# Patient Record
Sex: FEMALE | Race: BLACK OR AFRICAN AMERICAN | NOT HISPANIC OR LATINO
[De-identification: names, ages, dates, MRNs, and addresses within clinical notes are randomized per-mention and may not be internally consistent; named-entity substitution may affect disease eponyms.]

---

## 2017-08-26 ENCOUNTER — CHARTING TRANS (OUTPATIENT)
Dept: OTHER | Age: 27
End: 2017-08-26

## 2017-08-26 ENCOUNTER — LAB SERVICES (OUTPATIENT)
Dept: OTHER | Age: 27
End: 2017-08-26

## 2017-08-26 ASSESSMENT — PAIN SCALES - GENERAL: PAINLEVEL_OUTOF10: 7

## 2017-08-28 LAB — RAPID STREP GROUP A: POSITIVE

## 2017-08-29 ENCOUNTER — CHARTING TRANS (OUTPATIENT)
Dept: OTHER | Age: 27
End: 2017-08-29

## 2017-09-25 ENCOUNTER — CHARTING TRANS (OUTPATIENT)
Dept: OTHER | Age: 27
End: 2017-09-25

## 2017-09-25 ASSESSMENT — PAIN SCALES - GENERAL: PAINLEVEL_OUTOF10: 0

## 2017-09-26 ENCOUNTER — LAB SERVICES (OUTPATIENT)
Dept: OTHER | Age: 27
End: 2017-09-26

## 2017-09-26 LAB
APPEARANCE: NORMAL
BILIRUBIN: NORMAL
COLOR: YELLOW
GLUCOSE U: NORMAL
KETONES: NORMAL
LEUKOCYTES: NORMAL
NITRITE: NORMAL
OCCULT BLOOD: NORMAL
PH: 7
PROTEIN: NORMAL
URINE SPEC GRAVITY: 1.02
UROBILINOGEN: 0.2

## 2017-10-01 ENCOUNTER — CHARTING TRANS (OUTPATIENT)
Dept: OTHER | Age: 27
End: 2017-10-01

## 2017-10-01 LAB — BACTERIA UR CULT: NORMAL

## 2018-03-15 ENCOUNTER — HOSPITAL (OUTPATIENT)
Dept: OTHER | Age: 28
End: 2018-03-15
Attending: INTERNAL MEDICINE

## 2018-10-08 ENCOUNTER — CHARTING TRANS (OUTPATIENT)
Dept: OTHER | Age: 28
End: 2018-10-08

## 2018-10-08 ASSESSMENT — PAIN SCALES - GENERAL: PAINLEVEL_OUTOF10: 7

## 2018-11-02 VITALS
WEIGHT: 293 LBS | TEMPERATURE: 99.1 F | RESPIRATION RATE: 18 BRPM | HEART RATE: 70 BPM | HEIGHT: 67 IN | SYSTOLIC BLOOD PRESSURE: 133 MMHG | DIASTOLIC BLOOD PRESSURE: 81 MMHG | BODY MASS INDEX: 45.99 KG/M2 | OXYGEN SATURATION: 100 %

## 2018-11-03 VITALS
HEIGHT: 69 IN | RESPIRATION RATE: 18 BRPM | SYSTOLIC BLOOD PRESSURE: 138 MMHG | DIASTOLIC BLOOD PRESSURE: 90 MMHG | TEMPERATURE: 98.9 F | HEART RATE: 81 BPM | BODY MASS INDEX: 43.4 KG/M2 | OXYGEN SATURATION: 97 % | WEIGHT: 293 LBS

## 2018-11-03 VITALS
RESPIRATION RATE: 20 BRPM | OXYGEN SATURATION: 100 % | DIASTOLIC BLOOD PRESSURE: 88 MMHG | SYSTOLIC BLOOD PRESSURE: 135 MMHG | BODY MASS INDEX: 43.4 KG/M2 | HEIGHT: 69 IN | HEART RATE: 68 BPM | TEMPERATURE: 99.9 F | WEIGHT: 293 LBS

## 2018-11-27 VITALS
HEIGHT: 67 IN | TEMPERATURE: 98.1 F | SYSTOLIC BLOOD PRESSURE: 138 MMHG | HEART RATE: 67 BPM | RESPIRATION RATE: 18 BRPM | BODY MASS INDEX: 45.99 KG/M2 | OXYGEN SATURATION: 100 % | DIASTOLIC BLOOD PRESSURE: 87 MMHG | WEIGHT: 293 LBS

## 2019-04-13 ENCOUNTER — OFFICE VISIT (OUTPATIENT)
Dept: URGENT CARE | Age: 29
End: 2019-04-13

## 2019-04-13 VITALS
RESPIRATION RATE: 18 BRPM | WEIGHT: 293 LBS | SYSTOLIC BLOOD PRESSURE: 140 MMHG | TEMPERATURE: 98.3 F | HEIGHT: 69 IN | DIASTOLIC BLOOD PRESSURE: 85 MMHG | HEART RATE: 73 BPM | OXYGEN SATURATION: 100 % | BODY MASS INDEX: 43.4 KG/M2

## 2019-04-13 DIAGNOSIS — B97.89 VIRAL SINUSITIS: Primary | ICD-10-CM

## 2019-04-13 DIAGNOSIS — J32.9 VIRAL SINUSITIS: Primary | ICD-10-CM

## 2019-04-13 PROCEDURE — 99213 OFFICE O/P EST LOW 20 MIN: CPT | Performed by: NURSE PRACTITIONER

## 2019-04-13 RX ORDER — FLUTICASONE PROPIONATE 50 MCG
2 SPRAY, SUSPENSION (ML) NASAL DAILY
Qty: 16 G | Refills: 2 | Status: SHIPPED | OUTPATIENT
Start: 2019-04-13 | End: 2019-07-12

## 2019-04-13 RX ORDER — SODIUM CHLORIDE 0.65 %
2 AEROSOL, SPRAY (ML) NASAL PRN
Qty: 30 ML | Refills: 5 | Status: SHIPPED | OUTPATIENT
Start: 2019-04-13 | End: 2019-05-13

## 2019-04-13 ASSESSMENT — ENCOUNTER SYMPTOMS
GASTROINTESTINAL NEGATIVE: 1
CHILLS: 0
RESPIRATORY NEGATIVE: 1
SINUS PRESSURE: 1
FEVER: 0
SINUS PAIN: 0
HEADACHES: 0
SORE THROAT: 1
RHINORRHEA: 1
ADENOPATHY: 0
DIAPHORESIS: 0

## 2022-08-08 ENCOUNTER — OFFICE VISIT (OUTPATIENT)
Dept: FAMILY MEDICINE CLINIC | Facility: CLINIC | Age: 32
End: 2022-08-08
Payer: MEDICAID

## 2022-08-08 ENCOUNTER — PATIENT MESSAGE (OUTPATIENT)
Dept: FAMILY MEDICINE CLINIC | Facility: CLINIC | Age: 32
End: 2022-08-08

## 2022-08-08 VITALS
HEART RATE: 78 BPM | OXYGEN SATURATION: 98 % | SYSTOLIC BLOOD PRESSURE: 130 MMHG | DIASTOLIC BLOOD PRESSURE: 84 MMHG | TEMPERATURE: 98 F | WEIGHT: 293 LBS

## 2022-08-08 DIAGNOSIS — M79.601 PAIN IN BOTH UPPER EXTREMITIES: Primary | ICD-10-CM

## 2022-08-08 DIAGNOSIS — M79.602 PAIN IN BOTH UPPER EXTREMITIES: Primary | ICD-10-CM

## 2022-08-08 PROBLEM — D50.9 IRON DEFICIENCY ANEMIA: Status: ACTIVE | Noted: 2020-11-06

## 2022-08-08 PROBLEM — E66.01 MORBID OBESITY (HCC): Status: ACTIVE | Noted: 2022-08-08

## 2022-08-08 PROCEDURE — 3075F SYST BP GE 130 - 139MM HG: CPT | Performed by: FAMILY MEDICINE

## 2022-08-08 PROCEDURE — 3079F DIAST BP 80-89 MM HG: CPT | Performed by: FAMILY MEDICINE

## 2022-08-08 PROCEDURE — 99203 OFFICE O/P NEW LOW 30 MIN: CPT | Performed by: FAMILY MEDICINE

## 2022-08-08 RX ORDER — CYCLOBENZAPRINE HCL 10 MG
1 TABLET ORAL 3 TIMES DAILY PRN
COMMUNITY
Start: 2022-08-05

## 2022-08-08 RX ORDER — IBUPROFEN 600 MG/1
800 TABLET ORAL
COMMUNITY
Start: 2021-04-23 | End: 2022-08-08

## 2022-08-08 RX ORDER — IBUPROFEN 800 MG/1
1 TABLET ORAL EVERY 6 HOURS PRN
COMMUNITY
Start: 2022-08-05

## 2022-08-08 RX ORDER — CYCLOBENZAPRINE HCL 10 MG
10 TABLET ORAL 3 TIMES DAILY PRN
COMMUNITY
Start: 2022-08-05 | End: 2022-08-08

## 2022-08-08 NOTE — TELEPHONE ENCOUNTER
From: Sugey Patel  To: Nimesh Sneed MD  Sent: 8/8/2022 3:03 PM CDT  Subject: Note for work    Good Afternoon Doc. [place],    After speaking with my employer they do not have any light duty work that is truly light duty. The work they are requiring me to do still requires lifting. Per our discussion in the office today can you please update my note for work stating I am unable to return to work until 08/22/22.

## 2022-08-08 NOTE — TELEPHONE ENCOUNTER
Pt employer unable to accommodate light duty and pt requesting off work note until next apt 08/22/22    Note pended for review and sig if ok

## 2022-08-22 ENCOUNTER — OFFICE VISIT (OUTPATIENT)
Dept: FAMILY MEDICINE CLINIC | Facility: CLINIC | Age: 32
End: 2022-08-22
Payer: MEDICAID

## 2022-08-22 VITALS
HEIGHT: 66.63 IN | TEMPERATURE: 98 F | BODY MASS INDEX: 46.53 KG/M2 | RESPIRATION RATE: 16 BRPM | WEIGHT: 293 LBS | DIASTOLIC BLOOD PRESSURE: 90 MMHG | SYSTOLIC BLOOD PRESSURE: 132 MMHG | HEART RATE: 68 BPM | OXYGEN SATURATION: 98 %

## 2022-08-22 DIAGNOSIS — Z13.6 SCREENING FOR CARDIOVASCULAR CONDITION: ICD-10-CM

## 2022-08-22 DIAGNOSIS — Z00.00 ANNUAL PHYSICAL EXAM: Primary | ICD-10-CM

## 2022-08-22 DIAGNOSIS — Z13.0 SCREENING FOR ENDOCRINE, METABOLIC, AND IMMUNITY DISORDER: ICD-10-CM

## 2022-08-22 DIAGNOSIS — Z13.228 SCREENING FOR ENDOCRINE, METABOLIC, AND IMMUNITY DISORDER: ICD-10-CM

## 2022-08-22 DIAGNOSIS — Z13.29 SCREENING FOR ENDOCRINE, METABOLIC, AND IMMUNITY DISORDER: ICD-10-CM

## 2022-08-22 DIAGNOSIS — M79.601 PAIN IN BOTH UPPER EXTREMITIES: ICD-10-CM

## 2022-08-22 DIAGNOSIS — D50.9 IRON DEFICIENCY ANEMIA, UNSPECIFIED IRON DEFICIENCY ANEMIA TYPE: ICD-10-CM

## 2022-08-22 DIAGNOSIS — N92.0 MENORRHAGIA WITH REGULAR CYCLE: ICD-10-CM

## 2022-08-22 DIAGNOSIS — M79.602 PAIN IN BOTH UPPER EXTREMITIES: ICD-10-CM

## 2022-08-22 DIAGNOSIS — R01.1 SYSTOLIC MURMUR: ICD-10-CM

## 2022-08-22 DIAGNOSIS — E66.01 MORBID OBESITY (HCC): ICD-10-CM

## 2022-08-22 PROCEDURE — 3008F BODY MASS INDEX DOCD: CPT | Performed by: FAMILY MEDICINE

## 2022-08-22 PROCEDURE — 99395 PREV VISIT EST AGE 18-39: CPT | Performed by: FAMILY MEDICINE

## 2022-08-22 PROCEDURE — 3075F SYST BP GE 130 - 139MM HG: CPT | Performed by: FAMILY MEDICINE

## 2022-08-22 PROCEDURE — 3080F DIAST BP >= 90 MM HG: CPT | Performed by: FAMILY MEDICINE

## 2022-08-23 ENCOUNTER — TELEPHONE (OUTPATIENT)
Dept: FAMILY MEDICINE CLINIC | Facility: CLINIC | Age: 32
End: 2022-08-23

## 2022-08-23 DIAGNOSIS — D50.0 IRON DEFICIENCY ANEMIA DUE TO CHRONIC BLOOD LOSS: Primary | ICD-10-CM

## 2022-08-23 LAB
% SATURATION: 3 % (CALC) (ref 16–45)
ABSOLUTE BASOPHILS: 10 CELLS/UL (ref 0–200)
ABSOLUTE EOSINOPHILS: 150 CELLS/UL (ref 15–500)
ABSOLUTE LYMPHOCYTES: 1540 CELLS/UL (ref 850–3900)
ABSOLUTE MONOCYTES: 380 CELLS/UL (ref 200–950)
ABSOLUTE NEUTROPHILS: 2920 CELLS/UL (ref 1500–7800)
ALBUMIN/GLOBULIN RATIO: 1.5 (CALC) (ref 1–2.5)
ALBUMIN: 4 G/DL (ref 3.6–5.1)
ALKALINE PHOSPHATASE: 49 U/L (ref 31–125)
ALT: 22 U/L (ref 6–29)
AST: 15 U/L (ref 10–30)
BASOPHILS: 0.2 %
BILIRUBIN, TOTAL: 0.4 MG/DL (ref 0.2–1.2)
BUN: 7 MG/DL (ref 7–25)
CALCIUM: 9.1 MG/DL (ref 8.6–10.2)
CARBON DIOXIDE: 25 MMOL/L (ref 20–32)
CHLORIDE: 105 MMOL/L (ref 98–110)
CHOL/HDLC RATIO: 3.9 (CALC)
CHOLESTEROL, TOTAL: 155 MG/DL
CREATININE: 0.62 MG/DL (ref 0.5–0.97)
EGFR: 122 ML/MIN/1.73M2
EOSINOPHILS: 3 %
FERRITIN: 3 NG/ML (ref 16–154)
GLOBULIN: 2.7 G/DL (CALC) (ref 1.9–3.7)
GLUCOSE: 86 MG/DL (ref 65–99)
HDL CHOLESTEROL: 40 MG/DL
HEMATOCRIT: 23.7 % (ref 35–45)
HEMOGLOBIN: 6.4 G/DL (ref 11.7–15.5)
IRON BINDING CAPACITY: 392 MCG/DL (CALC) (ref 250–450)
IRON, TOTAL: 11 MCG/DL (ref 40–190)
LDL-CHOLESTEROL: 100 MG/DL (CALC)
LYMPHOCYTES: 30.8 %
MCH: 17 PG (ref 27–33)
MCHC: 27 G/DL (ref 32–36)
MCV: 62.9 FL (ref 80–100)
MONOCYTES: 7.6 %
MPV: 10.2 FL (ref 7.5–12.5)
NEUTROPHILS: 58.4 %
NON-HDL CHOLESTEROL: 115 MG/DL (CALC)
PLATELET COUNT: 419 THOUSAND/UL (ref 140–400)
POTASSIUM: 4.3 MMOL/L (ref 3.5–5.3)
PROTEIN, TOTAL: 6.7 G/DL (ref 6.1–8.1)
RDW: 18.9 % (ref 11–15)
RED BLOOD CELL COUNT: 3.77 MILLION/UL (ref 3.8–5.1)
SODIUM: 137 MMOL/L (ref 135–146)
TRIGLYCERIDES: 63 MG/DL
TSH W/REFLEX TO FT4: 1.47 MIU/L
WHITE BLOOD CELL COUNT: 5 THOUSAND/UL (ref 3.8–10.8)

## 2022-08-23 NOTE — TELEPHONE ENCOUNTER
Patient notified of test results via my-chart. Patient has viewed test results.   Seen by patient Nadyaloraine Barrow on 8/23/2022 11:42 AM

## 2022-08-23 NOTE — TELEPHONE ENCOUNTER
Maryuri Greene from [place] [facility] called wanted to inform doctor of pt's CBC test result completed on 8/22/22, the Hemoglobin is low at 6.4, when the normal range is 11.7-15.

## 2022-08-23 NOTE — TELEPHONE ENCOUNTER
FYI to PCP   Results are available for all labs done at [facility] 8/22/22  Please advise

## 2022-08-29 ENCOUNTER — OFFICE VISIT (OUTPATIENT)
Dept: HEMATOLOGY/ONCOLOGY | Facility: HOSPITAL | Age: 32
End: 2022-08-29
Attending: INTERNAL MEDICINE
Payer: MEDICAID

## 2022-08-29 VITALS
WEIGHT: 293 LBS | BODY MASS INDEX: 51 KG/M2 | SYSTOLIC BLOOD PRESSURE: 138 MMHG | OXYGEN SATURATION: 100 % | RESPIRATION RATE: 16 BRPM | HEART RATE: 85 BPM | TEMPERATURE: 97 F | DIASTOLIC BLOOD PRESSURE: 67 MMHG

## 2022-08-29 DIAGNOSIS — D50.0 IRON DEFICIENCY ANEMIA DUE TO CHRONIC BLOOD LOSS: Primary | ICD-10-CM

## 2022-08-29 PROCEDURE — 99245 OFF/OP CONSLTJ NEW/EST HI 55: CPT | Performed by: INTERNAL MEDICINE

## 2022-08-29 NOTE — PROGRESS NOTES
New consult for low iron. Pt states her periods are heavy. She will be having a pelvic ultrasound. She complains of fatigue and occasional shortness of breath and dizziness.       Outpatient Oncology Care Plan  Problem list:  knowledge deficit    Problems related to:    disease/disease progression    Interventions:  provided general teaching    Expected outcomes:  understands plan of care    Progress towards outcome:  making progress    Education Record    Learner:  Patient  Barriers / Limitations:  None  Method:  Brief focused  Outcome:  Shows understanding  Comments:

## 2022-08-31 ENCOUNTER — MED REC SCAN ONLY (OUTPATIENT)
Dept: FAMILY MEDICINE CLINIC | Facility: CLINIC | Age: 32
End: 2022-08-31

## 2022-09-02 ENCOUNTER — OFFICE VISIT (OUTPATIENT)
Dept: HEMATOLOGY/ONCOLOGY | Facility: HOSPITAL | Age: 32
End: 2022-09-02
Attending: INTERNAL MEDICINE
Payer: MEDICAID

## 2022-09-02 VITALS
OXYGEN SATURATION: 99 % | HEART RATE: 72 BPM | DIASTOLIC BLOOD PRESSURE: 84 MMHG | TEMPERATURE: 98 F | SYSTOLIC BLOOD PRESSURE: 126 MMHG | RESPIRATION RATE: 18 BRPM

## 2022-09-02 DIAGNOSIS — D50.0 IRON DEFICIENCY ANEMIA DUE TO CHRONIC BLOOD LOSS: Primary | ICD-10-CM

## 2022-09-02 PROCEDURE — 96365 THER/PROPH/DIAG IV INF INIT: CPT

## 2022-09-02 PROCEDURE — 96376 TX/PRO/DX INJ SAME DRUG ADON: CPT

## 2022-09-09 ENCOUNTER — HOSPITAL ENCOUNTER (OUTPATIENT)
Dept: ULTRASOUND IMAGING | Age: 32
Discharge: HOME | End: 2022-09-09
Attending: FAMILY MEDICINE
Payer: MEDICAID

## 2022-09-09 DIAGNOSIS — N92.0 MENORRHAGIA WITH REGULAR CYCLE: ICD-10-CM

## 2022-09-09 PROCEDURE — 76856 US EXAM PELVIC COMPLETE: CPT | Performed by: FAMILY MEDICINE

## 2022-09-09 PROCEDURE — 76830 TRANSVAGINAL US NON-OB: CPT | Performed by: FAMILY MEDICINE

## 2022-09-10 ENCOUNTER — HOSPITAL ENCOUNTER (OUTPATIENT)
Dept: CV DIAGNOSTICS | Facility: HOSPITAL | Age: 32
Discharge: HOME | End: 2022-09-10
Attending: FAMILY MEDICINE
Payer: MEDICAID

## 2022-09-10 DIAGNOSIS — R01.1 SYSTOLIC MURMUR: ICD-10-CM

## 2022-09-10 PROCEDURE — 93306 TTE W/DOPPLER COMPLETE: CPT | Performed by: FAMILY MEDICINE

## 2022-09-14 ENCOUNTER — TELEPHONE (OUTPATIENT)
Dept: FAMILY MEDICINE CLINIC | Facility: CLINIC | Age: 32
End: 2022-09-14

## 2022-09-14 NOTE — TELEPHONE ENCOUNTER
Incoming fax received for pt for FMLA regarding missed worked days. Forms completed for dates 08/09/2022-08/22/2022. Pt has since returned to work without limitations.     Forms placed, in provider bin for review and signature

## 2022-09-15 NOTE — TELEPHONE ENCOUNTER
Completed forms faxed to [facility] administration to fax # [phone]. Confirmation received and pt notified.      Forms sent to scan

## 2022-10-24 ENCOUNTER — OFFICE VISIT (OUTPATIENT)
Dept: HEMATOLOGY/ONCOLOGY | Facility: HOSPITAL | Age: 32
End: 2022-10-24
Attending: INTERNAL MEDICINE
Payer: MEDICAID

## 2022-10-24 VITALS
OXYGEN SATURATION: 100 % | TEMPERATURE: 98 F | RESPIRATION RATE: 16 BRPM | BODY MASS INDEX: 46.53 KG/M2 | SYSTOLIC BLOOD PRESSURE: 139 MMHG | DIASTOLIC BLOOD PRESSURE: 84 MMHG | HEART RATE: 73 BPM | HEIGHT: 66.61 IN | WEIGHT: 293 LBS

## 2022-10-24 DIAGNOSIS — D50.0 IRON DEFICIENCY ANEMIA DUE TO CHRONIC BLOOD LOSS: ICD-10-CM

## 2022-10-24 DIAGNOSIS — N92.0 MENORRHAGIA WITH REGULAR CYCLE: Primary | ICD-10-CM

## 2022-10-24 LAB
BASOPHILS # BLD AUTO: 0.01 X10(3) UL (ref 0–0.2)
BASOPHILS NFR BLD AUTO: 0.2 %
DEPRECATED HBV CORE AB SER IA-ACNC: 3.2 NG/ML
EOSINOPHIL # BLD AUTO: 0.08 X10(3) UL (ref 0–0.7)
EOSINOPHIL NFR BLD AUTO: 1.7 %
ERYTHROCYTE [DISTWIDTH] IN BLOOD BY AUTOMATED COUNT: 23.9 %
HCT VFR BLD AUTO: 27.2 %
HGB BLD-MCNC: 7.7 G/DL
IMM GRANULOCYTES # BLD AUTO: 0.01 X10(3) UL (ref 0–1)
IMM GRANULOCYTES NFR BLD: 0.2 %
IRON SATN MFR SERPL: 2 %
IRON SERPL-MCNC: 10 UG/DL
LYMPHOCYTES # BLD AUTO: 1.63 X10(3) UL (ref 1–4)
LYMPHOCYTES NFR BLD AUTO: 34.2 %
MCH RBC QN AUTO: 19.3 PG (ref 26–34)
MCHC RBC AUTO-ENTMCNC: 28.3 G/DL (ref 31–37)
MCV RBC AUTO: 68 FL
MONOCYTES # BLD AUTO: 0.48 X10(3) UL (ref 0.1–1)
MONOCYTES NFR BLD AUTO: 10.1 %
NEUTROPHILS # BLD AUTO: 2.55 X10 (3) UL (ref 1.5–7.7)
NEUTROPHILS # BLD AUTO: 2.55 X10(3) UL (ref 1.5–7.7)
NEUTROPHILS NFR BLD AUTO: 53.6 %
PLATELET # BLD AUTO: 358 10(3)UL (ref 150–450)
PLATELET MORPHOLOGY: NORMAL
RBC # BLD AUTO: 4 X10(6)UL
TIBC SERPL-MCNC: 459 UG/DL (ref 240–450)
TRANSFERRIN SERPL-MCNC: 308 MG/DL (ref 200–360)
WBC # BLD AUTO: 4.8 X10(3) UL (ref 4–11)

## 2022-10-24 PROCEDURE — 99215 OFFICE O/P EST HI 40 MIN: CPT | Performed by: INTERNAL MEDICINE

## 2022-10-24 NOTE — PROGRESS NOTES
Problem: Breathing Pattern Ineffective  Goal: Air exchange is effective, demonstrated by Sp02 sat of greater then or = 92% (or as ordered)  Outcome: Outcome Met, Continue evaluating goal progress toward completion      Patient presents with: Follow - Up: MD assessment following iron    Pt is for follow up. Had IV iron infed on 09/02/2022. She reports to feeling much better; fatigue is improved and no SOB.     Education Record    Learner:  Patient    Disease / Diagnosis: JESUS    Barriers / Limitations:  None   Comments:    Method:  Brief focused   Comments:    General Topics:  Side effects and symptom management and Plan of care reviewed   Comments:    Outcome:  Shows understanding   Comments:

## 2022-10-24 NOTE — PROGRESS NOTES
Hi Ms Benoit Number, your iron levels are still low and you are still anemic. I'm going to schedule you for 2 IV iron infusions this time, each spaced a month apart, and then we will recheck iron levels when you come back to see me.

## 2022-10-31 ENCOUNTER — OFFICE VISIT (OUTPATIENT)
Dept: HEMATOLOGY/ONCOLOGY | Facility: HOSPITAL | Age: 32
End: 2022-10-31
Attending: INTERNAL MEDICINE
Payer: MEDICAID

## 2022-10-31 VITALS
HEART RATE: 82 BPM | TEMPERATURE: 98 F | DIASTOLIC BLOOD PRESSURE: 76 MMHG | SYSTOLIC BLOOD PRESSURE: 132 MMHG | OXYGEN SATURATION: 100 % | RESPIRATION RATE: 16 BRPM

## 2022-10-31 DIAGNOSIS — D50.0 IRON DEFICIENCY ANEMIA DUE TO CHRONIC BLOOD LOSS: Primary | ICD-10-CM

## 2022-10-31 PROCEDURE — 96365 THER/PROPH/DIAG IV INF INIT: CPT

## 2022-10-31 NOTE — PROGRESS NOTES
Pt here for iron infusion. Arrives Ambulating independently, accompanied by self    Modifications in dose or schedule: No     Frequency of blood return and site check throughout administration: Prior to administration   Discharged to Home, Ambulating independently, accompanied by self    Outpatient Oncology Care Plan  Problem list:  anemia  Problems related to:    disease/disease progression  Interventions:  administered iron  Expected outcomes:  optimal lab values  Progress towards outcome:  making progress    Education Record    Learner:  Patient  Barriers / Limitations:  None  Method:  Brief focused  Outcome:  Shows understanding  Comments:observed pt 30 min post infusion. Pt tolerated infusion. No c/o. VSS.  See flowsheet

## 2022-11-02 ENCOUNTER — PATIENT MESSAGE (OUTPATIENT)
Dept: FAMILY MEDICINE CLINIC | Facility: CLINIC | Age: 32
End: 2022-11-02

## 2022-11-16 ENCOUNTER — OFFICE VISIT (OUTPATIENT)
Dept: OBGYN CLINIC | Facility: CLINIC | Age: 32
End: 2022-11-16
Payer: MEDICAID

## 2022-11-16 VITALS
WEIGHT: 293 LBS | DIASTOLIC BLOOD PRESSURE: 82 MMHG | HEART RATE: 87 BPM | SYSTOLIC BLOOD PRESSURE: 126 MMHG | BODY MASS INDEX: 50 KG/M2

## 2022-11-16 DIAGNOSIS — D21.9 FIBROIDS: Primary | ICD-10-CM

## 2022-11-16 DIAGNOSIS — N92.1 MENOMETRORRHAGIA: ICD-10-CM

## 2022-11-16 DIAGNOSIS — D50.0 IRON DEFICIENCY ANEMIA DUE TO CHRONIC BLOOD LOSS: ICD-10-CM

## 2022-11-16 PROCEDURE — 99244 OFF/OP CNSLTJ NEW/EST MOD 40: CPT | Performed by: OBSTETRICS & GYNECOLOGY

## 2022-11-16 PROCEDURE — 3074F SYST BP LT 130 MM HG: CPT | Performed by: OBSTETRICS & GYNECOLOGY

## 2022-11-16 PROCEDURE — 3079F DIAST BP 80-89 MM HG: CPT | Performed by: OBSTETRICS & GYNECOLOGY

## 2022-11-16 RX ORDER — IBUPROFEN 800 MG/1
800 TABLET ORAL EVERY 6 HOURS PRN
COMMUNITY
Start: 2022-11-04

## 2022-11-16 RX ORDER — NORETHINDRONE ACETATE AND ETHINYL ESTRADIOL .03; 1.5 MG/1; MG/1
1 TABLET ORAL DAILY
Qty: 3 EACH | Refills: 3 | Status: SHIPPED | OUTPATIENT
Start: 2022-11-16 | End: 2023-11-16

## 2022-11-28 ENCOUNTER — OFFICE VISIT (OUTPATIENT)
Dept: HEMATOLOGY/ONCOLOGY | Facility: HOSPITAL | Age: 32
End: 2022-11-28
Attending: INTERNAL MEDICINE
Payer: MEDICAID

## 2022-11-28 VITALS
BODY MASS INDEX: 46.53 KG/M2 | OXYGEN SATURATION: 99 % | WEIGHT: 293 LBS | HEIGHT: 66.61 IN | RESPIRATION RATE: 16 BRPM | HEART RATE: 60 BPM | SYSTOLIC BLOOD PRESSURE: 130 MMHG | DIASTOLIC BLOOD PRESSURE: 77 MMHG

## 2022-11-28 DIAGNOSIS — D50.0 IRON DEFICIENCY ANEMIA DUE TO CHRONIC BLOOD LOSS: Primary | ICD-10-CM

## 2022-11-28 PROCEDURE — 96365 THER/PROPH/DIAG IV INF INIT: CPT

## 2022-11-28 NOTE — PROGRESS NOTES
Education Record    Learner:  Patient    Disease / Diagnosis: anemia, infed infusion    Barriers / Limitations:  None   Comments:    Method:  Discussion   Comments:    General Topics:  Plan of care reviewed   Comments:    Outcome:  Shows understanding   Comments:

## 2022-12-29 ENCOUNTER — OFFICE VISIT (OUTPATIENT)
Dept: HEMATOLOGY/ONCOLOGY | Facility: HOSPITAL | Age: 32
End: 2022-12-29
Attending: INTERNAL MEDICINE
Payer: MEDICAID

## 2022-12-29 VITALS
HEART RATE: 67 BPM | WEIGHT: 293 LBS | SYSTOLIC BLOOD PRESSURE: 139 MMHG | BODY MASS INDEX: 46.53 KG/M2 | RESPIRATION RATE: 18 BRPM | OXYGEN SATURATION: 100 % | TEMPERATURE: 98 F | DIASTOLIC BLOOD PRESSURE: 81 MMHG | HEIGHT: 66.61 IN

## 2022-12-29 DIAGNOSIS — N92.0 MENORRHAGIA WITH REGULAR CYCLE: ICD-10-CM

## 2022-12-29 DIAGNOSIS — D50.0 IRON DEFICIENCY ANEMIA DUE TO CHRONIC BLOOD LOSS: ICD-10-CM

## 2022-12-29 LAB
BASOPHILS # BLD AUTO: 0.01 X10(3) UL (ref 0–0.2)
BASOPHILS NFR BLD AUTO: 0.2 %
DEPRECATED HBV CORE AB SER IA-ACNC: 14.8 NG/ML
EOSINOPHIL # BLD AUTO: 0.07 X10(3) UL (ref 0–0.7)
EOSINOPHIL NFR BLD AUTO: 1.4 %
ERYTHROCYTE [DISTWIDTH] IN BLOOD BY AUTOMATED COUNT: 22.6 %
FOLATE SERPL-MCNC: 7.3 NG/ML (ref 8.7–?)
HCT VFR BLD AUTO: 29.3 %
HGB BLD-MCNC: 8.9 G/DL
IMM GRANULOCYTES # BLD AUTO: 0.01 X10(3) UL (ref 0–1)
IMM GRANULOCYTES NFR BLD: 0.2 %
IRON SATN MFR SERPL: 6 %
IRON SERPL-MCNC: 23 UG/DL
LYMPHOCYTES # BLD AUTO: 1.52 X10(3) UL (ref 1–4)
LYMPHOCYTES NFR BLD AUTO: 30.3 %
MCH RBC QN AUTO: 22.7 PG (ref 26–34)
MCHC RBC AUTO-ENTMCNC: 30.4 G/DL (ref 31–37)
MCV RBC AUTO: 74.7 FL
MONOCYTES # BLD AUTO: 0.47 X10(3) UL (ref 0.1–1)
MONOCYTES NFR BLD AUTO: 9.4 %
NEUTROPHILS # BLD AUTO: 2.94 X10 (3) UL (ref 1.5–7.7)
NEUTROPHILS # BLD AUTO: 2.94 X10(3) UL (ref 1.5–7.7)
NEUTROPHILS NFR BLD AUTO: 58.5 %
PLATELET # BLD AUTO: 302 10(3)UL (ref 150–450)
PLATELET MORPHOLOGY: NORMAL
RBC # BLD AUTO: 3.92 X10(6)UL
TIBC SERPL-MCNC: 383 UG/DL (ref 240–450)
TRANSFERRIN SERPL-MCNC: 257 MG/DL (ref 200–360)
VIT B12 SERPL-MCNC: 516 PG/ML (ref 193–986)
WBC # BLD AUTO: 5 X10(3) UL (ref 4–11)

## 2022-12-29 PROCEDURE — 99215 OFFICE O/P EST HI 40 MIN: CPT | Performed by: INTERNAL MEDICINE

## 2022-12-29 RX ORDER — FOLIC ACID 1 MG/1
1 TABLET ORAL DAILY
Qty: 90 TABLET | Refills: 3 | Status: SHIPPED | OUTPATIENT
Start: 2022-12-29

## 2022-12-29 NOTE — PROGRESS NOTES
Education Record    Learner:  Patient    Disease / Adelina Dodd    Barriers / Limitations:  None   Comments:    Method:  Discussion   Comments:    General Topics:  Plan of care reviewed   Comments:    Outcome:  Shows understanding   Comments:  Last infusion 11/28/22, infusion went well. Has noticed that her period will come earlier after infusion, and is heavier and more cramping. Pt feeling fine. Periods are still heavy, has stopped the BCP, had an appt in Feb to readdress. Reports the BCP made her periods worse.

## 2023-01-06 ENCOUNTER — APPOINTMENT (OUTPATIENT)
Dept: HEMATOLOGY/ONCOLOGY | Facility: HOSPITAL | Age: 33
End: 2023-01-06
Attending: INTERNAL MEDICINE
Payer: MEDICAID

## 2023-01-13 ENCOUNTER — OFFICE VISIT (OUTPATIENT)
Dept: HEMATOLOGY/ONCOLOGY | Facility: HOSPITAL | Age: 33
End: 2023-01-13
Attending: INTERNAL MEDICINE
Payer: MEDICAID

## 2023-01-13 VITALS
HEART RATE: 61 BPM | DIASTOLIC BLOOD PRESSURE: 79 MMHG | SYSTOLIC BLOOD PRESSURE: 124 MMHG | TEMPERATURE: 98 F | RESPIRATION RATE: 16 BRPM | OXYGEN SATURATION: 100 %

## 2023-01-13 DIAGNOSIS — D50.0 IRON DEFICIENCY ANEMIA DUE TO CHRONIC BLOOD LOSS: Primary | ICD-10-CM

## 2023-01-13 PROCEDURE — 96365 THER/PROPH/DIAG IV INF INIT: CPT

## 2023-02-03 ENCOUNTER — APPOINTMENT (OUTPATIENT)
Dept: HEMATOLOGY/ONCOLOGY | Facility: HOSPITAL | Age: 33
End: 2023-02-03
Attending: INTERNAL MEDICINE
Payer: MEDICAID

## 2023-02-10 ENCOUNTER — OFFICE VISIT (OUTPATIENT)
Dept: HEMATOLOGY/ONCOLOGY | Facility: HOSPITAL | Age: 33
End: 2023-02-10
Attending: INTERNAL MEDICINE
Payer: MEDICAID

## 2023-02-10 VITALS
HEART RATE: 64 BPM | DIASTOLIC BLOOD PRESSURE: 75 MMHG | SYSTOLIC BLOOD PRESSURE: 113 MMHG | RESPIRATION RATE: 18 BRPM | TEMPERATURE: 98 F | OXYGEN SATURATION: 99 %

## 2023-02-10 DIAGNOSIS — D50.0 IRON DEFICIENCY ANEMIA DUE TO CHRONIC BLOOD LOSS: Primary | ICD-10-CM

## 2023-02-10 PROCEDURE — 96365 THER/PROPH/DIAG IV INF INIT: CPT

## 2023-02-13 ENCOUNTER — OFFICE VISIT (OUTPATIENT)
Dept: OBGYN CLINIC | Facility: CLINIC | Age: 33
End: 2023-02-13
Payer: MEDICAID

## 2023-02-13 VITALS
HEIGHT: 69 IN | DIASTOLIC BLOOD PRESSURE: 80 MMHG | WEIGHT: 293 LBS | SYSTOLIC BLOOD PRESSURE: 132 MMHG | HEART RATE: 81 BPM | BODY MASS INDEX: 43.4 KG/M2

## 2023-02-13 DIAGNOSIS — D50.0 IRON DEFICIENCY ANEMIA DUE TO CHRONIC BLOOD LOSS: ICD-10-CM

## 2023-02-13 DIAGNOSIS — N92.0 MENORRHAGIA WITH REGULAR CYCLE: ICD-10-CM

## 2023-02-13 DIAGNOSIS — D25.9 UTERINE LEIOMYOMA, UNSPECIFIED LOCATION: ICD-10-CM

## 2023-02-13 DIAGNOSIS — Z01.812 PRE-PROCEDURAL LABORATORY EXAMINATION: ICD-10-CM

## 2023-02-13 DIAGNOSIS — N93.9 ABNORMAL UTERINE BLEEDING (AUB): Primary | ICD-10-CM

## 2023-02-13 LAB
CONTROL LINE PRESENT WITH A CLEAR BACKGROUND (YES/NO): YES YES/NO
KIT LOT #: NORMAL NUMERIC
PREGNANCY TEST, URINE: NEGATIVE

## 2023-02-13 PROCEDURE — 88305 TISSUE EXAM BY PATHOLOGIST: CPT | Performed by: OBSTETRICS & GYNECOLOGY

## 2023-02-13 RX ORDER — DROSPIRENONE AND ETHINYL ESTRADIOL 0.03MG-3MG
1 KIT ORAL DAILY
Qty: 84 TABLET | Refills: 3 | Status: SHIPPED | OUTPATIENT
Start: 2023-02-13 | End: 2024-02-13

## 2023-02-13 NOTE — PROCEDURES
Procedure: Endometrial biopsy     Date of Procedure: 23    Pre-procedure diagnosis:   AUB    Post-procedure diagnosis:    AUB    Indications:   33year old female  who presents for endometrial biopsy / AUB    Procedure details: The procedure, risks, benefits and alternatives were discussed with the patient. The patient was informed of risks including but not limited to the risk of bleeding, infection, injury and insufficient tissue collection. All questions and concerns were addressed. The patient provided verbal and written consent. The patient was placed in a supine position with feet positioned into stirrups. A sterile speculum was placed into the vagina and the cervix was visualized with findings noted below. The cervix was cleaned and prepped with betadine. Lidocaine gel applied. An Jaylyn was placed on the anterior lip of the cervix. The endometrial Pipelle was then advanced through the cervical canal. The uterus sounded to 10+ cm. The Pipelle was then engaged with suction force noted and advance in various angles along the uterine cavity. A moderate amount of endometrial tissue was noted and collected to be sent to pathology. This was repeated for 1 more pass. The Jaylyn was removed. Good hemostasis noted. The patient tolerated the procedure well. The patient was advised to return as directed. Findings:   Normal cervix, no lesions   Normal uterus, sounded to 10+ cm   Moderate amount of endometrial tissue   S/p EMB  Good hemostasis     Disposition: Stable    Complications: None    Follow up:  As directed     Delmis Nieves MD   [facility] - OBGYN    Note to patient and family   The [facility] makes medical notes available to patients in the interest of transparency. However, please be advised that this is a medical document. It is intended as irdi-yv-uswg communication. It is written and medical language may contain abbreviations or verbiage that are technical and unfamiliar.   It may appear blunt or direct. Medical documents are intended to carry relevant information, facts as evident, and the clinical opinion of the practitioner. This note could include assistance by [employer] recognition.  Errors in content may be related to improper recognition by the system; efforts to review and correct have been done but errors may still exist.

## 2023-02-14 NOTE — PATIENT INSTRUCTIONS
[facility] Department of OB/GYN  After Care Instructions for Endometrial Biopsy      Biopsy Results   You will receive a phone call with your biopsy results in 7 business days. If you have not received your results in 7 days, please contact our office. The results of your biopsy will determine if further treatment will be necessary. Bleeding   You may have some light bleeding or blackish clumpy discharge for several days after your biopsy. Restrictions    You should avoid intercourse or tampon use for 1 day after your biopsy. Pain    You may experience mild menstrual cramping after your biopsy. You may use Ibuprofen, Aleve or Tylenol to relieve your discomfort. If you experience severe or persistent pain contact our office. If you have any additional questions, please call us at [phone].

## 2023-04-03 ENCOUNTER — PATIENT MESSAGE (OUTPATIENT)
Dept: OBGYN CLINIC | Facility: CLINIC | Age: 33
End: 2023-04-03

## 2023-04-06 ENCOUNTER — HOSPITAL ENCOUNTER (EMERGENCY)
Facility: HOSPITAL | Age: 33
Discharge: HOME | End: 2023-04-07
Attending: EMERGENCY MEDICINE
Payer: MEDICAID

## 2023-04-06 DIAGNOSIS — D50.0 IRON DEFICIENCY ANEMIA DUE TO CHRONIC BLOOD LOSS: Primary | ICD-10-CM

## 2023-04-06 LAB
ALBUMIN SERPL-MCNC: 3.4 G/DL (ref 3.4–5)
ALBUMIN/GLOB SERPL: 1 {RATIO} (ref 1–2)
ALP LIVER SERPL-CCNC: 47 U/L
ALT SERPL-CCNC: 36 U/L
ANION GAP SERPL CALC-SCNC: 9 MMOL/L (ref 0–18)
ANTIBODY SCREEN: NEGATIVE
AST SERPL-CCNC: 23 U/L (ref 15–37)
BASOPHILS # BLD AUTO: 0.02 X10(3) UL (ref 0–0.2)
BASOPHILS NFR BLD AUTO: 0.2 %
BILIRUB SERPL-MCNC: 0.2 MG/DL (ref 0.1–2)
BUN BLD-MCNC: 13 MG/DL (ref 7–18)
CALCIUM BLD-MCNC: 8.5 MG/DL (ref 8.5–10.1)
CHLORIDE SERPL-SCNC: 107 MMOL/L (ref 98–112)
CO2 SERPL-SCNC: 25 MMOL/L (ref 21–32)
CREAT BLD-MCNC: 0.75 MG/DL
EOSINOPHIL # BLD AUTO: 0.23 X10(3) UL (ref 0–0.7)
EOSINOPHIL NFR BLD AUTO: 2.4 %
ERYTHROCYTE [DISTWIDTH] IN BLOOD BY AUTOMATED COUNT: 20.3 %
GFR SERPLBLD BASED ON 1.73 SQ M-ARVRAT: 108 ML/MIN/1.73M2 (ref 60–?)
GLOBULIN PLAS-MCNC: 3.4 G/DL (ref 2.8–4.4)
GLUCOSE BLD-MCNC: 99 MG/DL (ref 70–99)
HCT VFR BLD AUTO: 22.1 %
HGB BLD-MCNC: 6.9 G/DL
IMM GRANULOCYTES # BLD AUTO: 0.03 X10(3) UL (ref 0–1)
IMM GRANULOCYTES NFR BLD: 0.3 %
LYMPHOCYTES # BLD AUTO: 2.81 X10(3) UL (ref 1–4)
LYMPHOCYTES NFR BLD AUTO: 29.2 %
MCH RBC QN AUTO: 25.1 PG (ref 26–34)
MCHC RBC AUTO-ENTMCNC: 31.2 G/DL (ref 31–37)
MCV RBC AUTO: 80.4 FL
MONOCYTES # BLD AUTO: 0.56 X10(3) UL (ref 0.1–1)
MONOCYTES NFR BLD AUTO: 5.8 %
NEUTROPHILS # BLD AUTO: 5.98 X10 (3) UL (ref 1.5–7.7)
NEUTROPHILS # BLD AUTO: 5.98 X10(3) UL (ref 1.5–7.7)
NEUTROPHILS NFR BLD AUTO: 62.1 %
OSMOLALITY SERPL CALC.SUM OF ELEC: 292 MOSM/KG (ref 275–295)
PLATELET # BLD AUTO: 328 10(3)UL (ref 150–450)
POTASSIUM SERPL-SCNC: 3.9 MMOL/L (ref 3.5–5.1)
PROT SERPL-MCNC: 6.8 G/DL (ref 6.4–8.2)
RBC # BLD AUTO: 2.75 X10(6)UL
RH BLOOD TYPE: POSITIVE
SODIUM SERPL-SCNC: 141 MMOL/L (ref 136–145)
WBC # BLD AUTO: 9.6 X10(3) UL (ref 4–11)

## 2023-04-06 PROCEDURE — 80053 COMPREHEN METABOLIC PANEL: CPT | Performed by: EMERGENCY MEDICINE

## 2023-04-06 PROCEDURE — 85025 COMPLETE CBC W/AUTO DIFF WBC: CPT | Performed by: EMERGENCY MEDICINE

## 2023-04-06 PROCEDURE — 93010 ELECTROCARDIOGRAM REPORT: CPT

## 2023-04-06 PROCEDURE — 86901 BLOOD TYPING SEROLOGIC RH(D): CPT

## 2023-04-06 PROCEDURE — 99285 EMERGENCY DEPT VISIT HI MDM: CPT

## 2023-04-06 PROCEDURE — 86850 RBC ANTIBODY SCREEN: CPT | Performed by: EMERGENCY MEDICINE

## 2023-04-06 PROCEDURE — 80053 COMPREHEN METABOLIC PANEL: CPT

## 2023-04-06 PROCEDURE — 36415 COLL VENOUS BLD VENIPUNCTURE: CPT

## 2023-04-06 PROCEDURE — 86900 BLOOD TYPING SEROLOGIC ABO: CPT | Performed by: EMERGENCY MEDICINE

## 2023-04-06 PROCEDURE — 86900 BLOOD TYPING SEROLOGIC ABO: CPT

## 2023-04-06 PROCEDURE — 36430 TRANSFUSION BLD/BLD COMPNT: CPT

## 2023-04-06 PROCEDURE — 86850 RBC ANTIBODY SCREEN: CPT

## 2023-04-06 PROCEDURE — 86920 COMPATIBILITY TEST SPIN: CPT

## 2023-04-06 PROCEDURE — 93005 ELECTROCARDIOGRAM TRACING: CPT

## 2023-04-06 PROCEDURE — 86901 BLOOD TYPING SEROLOGIC RH(D): CPT | Performed by: EMERGENCY MEDICINE

## 2023-04-06 PROCEDURE — 85025 COMPLETE CBC W/AUTO DIFF WBC: CPT

## 2023-04-06 NOTE — TELEPHONE ENCOUNTER
If the patient continues to have moderate heavy bleeding then I would advise to initiate a OCP taper. We can use a tapering regimen of her current OCP. Start with three pills on day 1 of her OCP followed by two pills on day 2, and one pill on day 3 and continue daily pills. To improve tolerability, the pills may be taken several hours apart. Take with food. As we discussed, the patient will continue to have heavy menstrual bleeding and persistent anemia due to her large uterine fibroid. I would advise with patient to consider surgical management for removal of her large uterine fibroid. I would also advised the patient to consider surgical consultation with a minimally invasive GYN surgeon if she desires a laparoscopic approach. If the patient desires to discuss further medical management or surgical management then I would advise for her to make an appointment in the office. Thank you.

## 2023-04-06 NOTE — TELEPHONE ENCOUNTER
Went over options with patient that Dr. Steve Nova advised. Discussed tapering dose with patient. Patient states she is still having a headache and dizziness. I did advise ER again for eval of symptoms and to have blood levels checked due to the heavy bleeding. Patient will go to ER. Patient states she will call back for an appointment to discuss surgery. Understanding expressed.

## 2023-04-07 VITALS
TEMPERATURE: 98 F | DIASTOLIC BLOOD PRESSURE: 87 MMHG | RESPIRATION RATE: 24 BRPM | OXYGEN SATURATION: 100 % | HEART RATE: 83 BPM | SYSTOLIC BLOOD PRESSURE: 171 MMHG

## 2023-04-07 LAB
ATRIAL RATE: 92 BPM
P AXIS: 60 DEGREES
P-R INTERVAL: 170 MS
Q-T INTERVAL: 364 MS
QRS DURATION: 86 MS
QTC CALCULATION (BEZET): 450 MS
R AXIS: 9 DEGREES
T AXIS: 27 DEGREES
VENTRICULAR RATE: 92 BPM

## 2023-04-07 NOTE — ED INITIAL ASSESSMENT (HPI)
A&Ox3 ambulatory patient referred to ED from [facility] for low hgb    Patient has hx of anemia and fibroids, has recd multiple blood transfusions in the past    Reports sob/LH/dizziness/HA since Monday    Also endorses heavy menstrual flow at this time    Had POCT hgb done at [facility] resulting <7

## 2023-04-08 LAB
BLOOD TYPE BARCODE: 5100
UNIT VOLUME: 350 ML

## 2023-04-10 ENCOUNTER — APPOINTMENT (OUTPATIENT)
Dept: HEMATOLOGY/ONCOLOGY | Age: 33
End: 2023-04-10
Attending: INTERNAL MEDICINE
Payer: MEDICAID

## 2023-04-10 ENCOUNTER — APPOINTMENT (OUTPATIENT)
Dept: HEMATOLOGY/ONCOLOGY | Facility: HOSPITAL | Age: 33
End: 2023-04-10
Attending: INTERNAL MEDICINE
Payer: MEDICAID

## 2023-04-14 ENCOUNTER — LAB ENCOUNTER (OUTPATIENT)
Dept: LAB | Age: 33
End: 2023-04-14
Attending: FAMILY MEDICINE
Payer: MEDICAID

## 2023-04-14 DIAGNOSIS — D50.0 IRON DEFICIENCY ANEMIA DUE TO CHRONIC BLOOD LOSS: ICD-10-CM

## 2023-04-14 DIAGNOSIS — N92.0 MENORRHAGIA WITH REGULAR CYCLE: ICD-10-CM

## 2023-04-14 LAB
BASOPHILS # BLD AUTO: 0.01 X10(3) UL (ref 0–0.2)
BASOPHILS NFR BLD AUTO: 0.2 %
DEPRECATED HBV CORE AB SER IA-ACNC: 7.2 NG/ML
EOSINOPHIL # BLD AUTO: 0.09 X10(3) UL (ref 0–0.7)
EOSINOPHIL NFR BLD AUTO: 1.4 %
ERYTHROCYTE [DISTWIDTH] IN BLOOD BY AUTOMATED COUNT: 18.9 %
HCT VFR BLD AUTO: 26.4 %
HGB BLD-MCNC: 7.9 G/DL
IMM GRANULOCYTES # BLD AUTO: 0.01 X10(3) UL (ref 0–1)
IMM GRANULOCYTES NFR BLD: 0.2 %
IRON SATN MFR SERPL: 5 %
IRON SERPL-MCNC: 19 UG/DL
LYMPHOCYTES # BLD AUTO: 1.53 X10(3) UL (ref 1–4)
LYMPHOCYTES NFR BLD AUTO: 23 %
MCH RBC QN AUTO: 24.3 PG (ref 26–34)
MCHC RBC AUTO-ENTMCNC: 29.9 G/DL (ref 31–37)
MCV RBC AUTO: 81.2 FL
MONOCYTES # BLD AUTO: 0.45 X10(3) UL (ref 0.1–1)
MONOCYTES NFR BLD AUTO: 6.8 %
NEUTROPHILS # BLD AUTO: 4.56 X10 (3) UL (ref 1.5–7.7)
NEUTROPHILS # BLD AUTO: 4.56 X10(3) UL (ref 1.5–7.7)
NEUTROPHILS NFR BLD AUTO: 68.4 %
PLATELET # BLD AUTO: 381 10(3)UL (ref 150–450)
RBC # BLD AUTO: 3.25 X10(6)UL
TIBC SERPL-MCNC: 361 UG/DL (ref 240–450)
TRANSFERRIN SERPL-MCNC: 242 MG/DL (ref 200–360)
WBC # BLD AUTO: 6.7 X10(3) UL (ref 4–11)

## 2023-04-14 PROCEDURE — 85025 COMPLETE CBC W/AUTO DIFF WBC: CPT

## 2023-04-14 PROCEDURE — 83550 IRON BINDING TEST: CPT

## 2023-04-14 PROCEDURE — 82728 ASSAY OF FERRITIN: CPT

## 2023-04-14 PROCEDURE — 83540 ASSAY OF IRON: CPT

## 2023-04-14 PROCEDURE — 36415 COLL VENOUS BLD VENIPUNCTURE: CPT

## 2023-04-14 NOTE — PROGRESS NOTES
Pt with significant iron deficiency anemia. Required transfusion in ED. Calculated iron deficit ~1900mg. Infed now on national shortage. Continues to have significant bleeding but myomectomy not scheduled yet.  Will schedule 4 weekly doses of 510mg ferraheme

## 2023-04-18 ENCOUNTER — TELEPHONE (OUTPATIENT)
Dept: FAMILY MEDICINE CLINIC | Facility: CLINIC | Age: 33
End: 2023-04-18

## 2023-04-18 ENCOUNTER — OFFICE VISIT (OUTPATIENT)
Dept: FAMILY MEDICINE CLINIC | Facility: CLINIC | Age: 33
End: 2023-04-18
Payer: MEDICAID

## 2023-04-18 VITALS
SYSTOLIC BLOOD PRESSURE: 124 MMHG | WEIGHT: 293 LBS | HEART RATE: 68 BPM | OXYGEN SATURATION: 98 % | RESPIRATION RATE: 18 BRPM | BODY MASS INDEX: 46 KG/M2 | TEMPERATURE: 99 F | DIASTOLIC BLOOD PRESSURE: 78 MMHG

## 2023-04-18 DIAGNOSIS — E66.01 MORBID OBESITY (HCC): ICD-10-CM

## 2023-04-18 DIAGNOSIS — D50.9 IRON DEFICIENCY ANEMIA, UNSPECIFIED IRON DEFICIENCY ANEMIA TYPE: Primary | ICD-10-CM

## 2023-04-18 DIAGNOSIS — N92.0 MENORRHAGIA WITH REGULAR CYCLE: ICD-10-CM

## 2023-04-18 DIAGNOSIS — D25.9 UTERINE LEIOMYOMA, UNSPECIFIED LOCATION: ICD-10-CM

## 2023-04-18 PROCEDURE — 99214 OFFICE O/P EST MOD 30 MIN: CPT | Performed by: FAMILY MEDICINE

## 2023-04-18 PROCEDURE — 3074F SYST BP LT 130 MM HG: CPT | Performed by: FAMILY MEDICINE

## 2023-04-18 PROCEDURE — 3078F DIAST BP <80 MM HG: CPT | Performed by: FAMILY MEDICINE

## 2023-04-18 NOTE — TELEPHONE ENCOUNTER
Medical assessment form reviewed completed and signed by provider Dr Anna Ramirez.  Forms faxed to [facility] [phone] 4/18/23

## 2023-04-20 ENCOUNTER — OFFICE VISIT (OUTPATIENT)
Dept: HEMATOLOGY/ONCOLOGY | Facility: HOSPITAL | Age: 33
End: 2023-04-20
Attending: INTERNAL MEDICINE
Payer: MEDICAID

## 2023-04-20 VITALS
DIASTOLIC BLOOD PRESSURE: 65 MMHG | SYSTOLIC BLOOD PRESSURE: 123 MMHG | OXYGEN SATURATION: 98 % | HEART RATE: 75 BPM | RESPIRATION RATE: 20 BRPM

## 2023-04-20 DIAGNOSIS — D50.0 IRON DEFICIENCY ANEMIA DUE TO CHRONIC BLOOD LOSS: Primary | ICD-10-CM

## 2023-04-20 PROCEDURE — 96374 THER/PROPH/DIAG INJ IV PUSH: CPT

## 2023-04-20 NOTE — PROGRESS NOTES
Education Record    Learner:  Patient    Disease / Lila Estimable deficiency anemia due to chronic blood loss     Barriers / Limitations:  None   Comments:    Method:  Brief focused   Comments:    General Topics:  Infection, Medication, Pain, Precautions, Procedure, Side effects and symptom management, Plan of care reviewed and Fall risk and prevention   Comments:    Outcome:  Shows understanding   Comments: Tolerated well. Observed for 30 min.  VSS

## 2023-04-27 ENCOUNTER — OFFICE VISIT (OUTPATIENT)
Dept: HEMATOLOGY/ONCOLOGY | Facility: HOSPITAL | Age: 33
End: 2023-04-27
Attending: INTERNAL MEDICINE
Payer: MEDICAID

## 2023-04-27 VITALS
SYSTOLIC BLOOD PRESSURE: 131 MMHG | HEART RATE: 64 BPM | TEMPERATURE: 98 F | DIASTOLIC BLOOD PRESSURE: 72 MMHG | RESPIRATION RATE: 18 BRPM | OXYGEN SATURATION: 100 %

## 2023-04-27 DIAGNOSIS — D50.0 IRON DEFICIENCY ANEMIA DUE TO CHRONIC BLOOD LOSS: Primary | ICD-10-CM

## 2023-04-27 PROCEDURE — 96374 THER/PROPH/DIAG INJ IV PUSH: CPT

## 2023-04-27 NOTE — PROGRESS NOTES
Pt here for iron infusion. Arrives Ambulating independently, accompanied by self           Modifications in dose or schedule: No     Frequency of blood return and site check throughout administration: Prior to administration   Discharged to Home, Ambulating independently, accompanied by:self    Outpatient Oncology Care Plan  Problem list:  anemia  Problems related to:    disease/disease progression  Interventions:  administered iron  Expected outcomes:  optimal lab values  Progress towards outcome:  making progress    Education Record    Learner:  Patient  Barriers / Limitations:  None  Method:  Brief focused  Outcome:  Shows understanding  Comments:observed pt 30 min post infusion. Pt tolerated infusion. No c/o. VSS.  See flowsheet

## 2023-05-04 ENCOUNTER — OFFICE VISIT (OUTPATIENT)
Dept: HEMATOLOGY/ONCOLOGY | Facility: HOSPITAL | Age: 33
End: 2023-05-04
Attending: INTERNAL MEDICINE
Payer: MEDICAID

## 2023-05-04 VITALS
SYSTOLIC BLOOD PRESSURE: 125 MMHG | RESPIRATION RATE: 16 BRPM | DIASTOLIC BLOOD PRESSURE: 79 MMHG | TEMPERATURE: 98 F | OXYGEN SATURATION: 100 % | HEART RATE: 89 BPM

## 2023-05-04 DIAGNOSIS — D50.0 IRON DEFICIENCY ANEMIA DUE TO CHRONIC BLOOD LOSS: Primary | ICD-10-CM

## 2023-05-04 PROCEDURE — 96374 THER/PROPH/DIAG INJ IV PUSH: CPT

## 2023-05-04 NOTE — PROGRESS NOTES
Education Record    Learner:  Patient    Disease / Diagnosis: iron deficiency anemia    Barriers / Limitations:  None   Comments:    Method:  Brief focused   Comments:    General Topics:  Medication and Plan of care reviewed   Comments:    Outcome:  Shows understanding   Comments:

## 2023-05-11 ENCOUNTER — OFFICE VISIT (OUTPATIENT)
Dept: HEMATOLOGY/ONCOLOGY | Facility: HOSPITAL | Age: 33
End: 2023-05-11
Attending: INTERNAL MEDICINE
Payer: MEDICAID

## 2023-05-11 VITALS
TEMPERATURE: 99 F | OXYGEN SATURATION: 97 % | DIASTOLIC BLOOD PRESSURE: 72 MMHG | HEART RATE: 74 BPM | RESPIRATION RATE: 18 BRPM | SYSTOLIC BLOOD PRESSURE: 117 MMHG

## 2023-05-11 DIAGNOSIS — D50.0 IRON DEFICIENCY ANEMIA DUE TO CHRONIC BLOOD LOSS: Primary | ICD-10-CM

## 2023-05-11 PROCEDURE — 96374 THER/PROPH/DIAG INJ IV PUSH: CPT

## 2023-05-11 NOTE — PROGRESS NOTES
Education Record    Learner:  Patient    Disease / Diagnosis: iron deficiency anemia    Barriers / Limitations:  None   Comments:    Method:  Brief focused   Comments:    General Topics:  Medication and Plan of care reviewed   Comments:    Outcome:  Shows understanding   Comments:    Per BRADLEY Lui w/ Dr. Zorita Apley, patient to return in 2 months for labs only. Patient aware.

## 2023-06-14 ENCOUNTER — APPOINTMENT (OUTPATIENT)
Dept: ULTRASOUND IMAGING | Facility: HOSPITAL | Age: 33
End: 2023-06-14
Attending: EMERGENCY MEDICINE
Payer: MEDICAID

## 2023-06-14 ENCOUNTER — TELEMEDICINE (OUTPATIENT)
Dept: FAMILY MEDICINE CLINIC | Facility: CLINIC | Age: 33
End: 2023-06-14

## 2023-06-14 ENCOUNTER — OFFICE VISIT (OUTPATIENT)
Dept: OBGYN CLINIC | Facility: CLINIC | Age: 33
End: 2023-06-14
Payer: MEDICAID

## 2023-06-14 ENCOUNTER — HOSPITAL ENCOUNTER (EMERGENCY)
Facility: HOSPITAL | Age: 33
Discharge: HOME | End: 2023-06-14
Attending: EMERGENCY MEDICINE
Payer: MEDICAID

## 2023-06-14 VITALS
TEMPERATURE: 97 F | SYSTOLIC BLOOD PRESSURE: 106 MMHG | HEIGHT: 69 IN | HEART RATE: 80 BPM | RESPIRATION RATE: 11 BRPM | BODY MASS INDEX: 43.4 KG/M2 | OXYGEN SATURATION: 99 % | DIASTOLIC BLOOD PRESSURE: 70 MMHG | WEIGHT: 293 LBS

## 2023-06-14 VITALS
BODY MASS INDEX: 46 KG/M2 | HEART RATE: 98 BPM | SYSTOLIC BLOOD PRESSURE: 122 MMHG | WEIGHT: 293 LBS | DIASTOLIC BLOOD PRESSURE: 80 MMHG

## 2023-06-14 DIAGNOSIS — N93.8 DYSFUNCTIONAL UTERINE BLEEDING: Primary | ICD-10-CM

## 2023-06-14 DIAGNOSIS — D25.9 UTERINE LEIOMYOMA, UNSPECIFIED LOCATION: ICD-10-CM

## 2023-06-14 DIAGNOSIS — N93.9 ABNORMAL UTERINE BLEEDING (AUB): Primary | ICD-10-CM

## 2023-06-14 DIAGNOSIS — J30.1 ALLERGIC RHINITIS DUE TO POLLEN, UNSPECIFIED SEASONALITY: Primary | ICD-10-CM

## 2023-06-14 DIAGNOSIS — E66.01 MORBID OBESITY (HCC): ICD-10-CM

## 2023-06-14 DIAGNOSIS — R55 SYNCOPE, NEAR: ICD-10-CM

## 2023-06-14 DIAGNOSIS — N92.0 MENORRHAGIA WITH REGULAR CYCLE: ICD-10-CM

## 2023-06-14 DIAGNOSIS — D50.0 IRON DEFICIENCY ANEMIA DUE TO CHRONIC BLOOD LOSS: ICD-10-CM

## 2023-06-14 LAB
ALBUMIN SERPL-MCNC: 3.2 G/DL (ref 3.4–5)
ALBUMIN/GLOB SERPL: 1 {RATIO} (ref 1–2)
ALP LIVER SERPL-CCNC: 36 U/L
ALT SERPL-CCNC: 27 U/L
ANION GAP SERPL CALC-SCNC: 6 MMOL/L (ref 0–18)
ANTIBODY SCREEN: NEGATIVE
AST SERPL-CCNC: 22 U/L (ref 15–37)
ATRIAL RATE: 79 BPM
BASOPHILS # BLD AUTO: 0.01 X10(3) UL (ref 0–0.2)
BASOPHILS NFR BLD AUTO: 0.2 %
BILIRUB SERPL-MCNC: 0.2 MG/DL (ref 0.1–2)
BUN BLD-MCNC: 8 MG/DL (ref 7–18)
CALCIUM BLD-MCNC: 8.3 MG/DL (ref 8.5–10.1)
CHLORIDE SERPL-SCNC: 111 MMOL/L (ref 98–112)
CO2 SERPL-SCNC: 23 MMOL/L (ref 21–32)
CREAT BLD-MCNC: 0.84 MG/DL
D DIMER PPP FEU-MCNC: 0.4 UG/ML FEU (ref ?–0.5)
EOSINOPHIL # BLD AUTO: 0.05 X10(3) UL (ref 0–0.7)
EOSINOPHIL NFR BLD AUTO: 0.8 %
ERYTHROCYTE [DISTWIDTH] IN BLOOD BY AUTOMATED COUNT: 16.8 %
GFR SERPLBLD BASED ON 1.73 SQ M-ARVRAT: 95 ML/MIN/1.73M2 (ref 60–?)
GLOBULIN PLAS-MCNC: 3.1 G/DL (ref 2.8–4.4)
GLUCOSE BLD-MCNC: 140 MG/DL (ref 70–99)
HCT VFR BLD AUTO: 34.4 %
HGB BLD-MCNC: 11.1 G/DL
IMM GRANULOCYTES # BLD AUTO: 0.02 X10(3) UL (ref 0–1)
IMM GRANULOCYTES NFR BLD: 0.3 %
LYMPHOCYTES # BLD AUTO: 0.95 X10(3) UL (ref 1–4)
LYMPHOCYTES NFR BLD AUTO: 16 %
MCH RBC QN AUTO: 26.3 PG (ref 26–34)
MCHC RBC AUTO-ENTMCNC: 32.3 G/DL (ref 31–37)
MCV RBC AUTO: 81.5 FL
MONOCYTES # BLD AUTO: 0.36 X10(3) UL (ref 0.1–1)
MONOCYTES NFR BLD AUTO: 6.1 %
NEUTROPHILS # BLD AUTO: 4.55 X10 (3) UL (ref 1.5–7.7)
NEUTROPHILS # BLD AUTO: 4.55 X10(3) UL (ref 1.5–7.7)
NEUTROPHILS NFR BLD AUTO: 76.6 %
OSMOLALITY SERPL CALC.SUM OF ELEC: 291 MOSM/KG (ref 275–295)
P AXIS: 50 DEGREES
P-R INTERVAL: 160 MS
PLATELET # BLD AUTO: 288 10(3)UL (ref 150–450)
POTASSIUM SERPL-SCNC: 3.9 MMOL/L (ref 3.5–5.1)
PROT SERPL-MCNC: 6.3 G/DL (ref 6.4–8.2)
Q-T INTERVAL: 368 MS
QRS DURATION: 86 MS
QTC CALCULATION (BEZET): 421 MS
R AXIS: -1 DEGREES
RBC # BLD AUTO: 4.22 X10(6)UL
RH BLOOD TYPE: POSITIVE
SODIUM SERPL-SCNC: 140 MMOL/L (ref 136–145)
T AXIS: 35 DEGREES
TROPONIN I HIGH SENSITIVITY: 10 NG/L
VENTRICULAR RATE: 79 BPM
WBC # BLD AUTO: 5.9 X10(3) UL (ref 4–11)

## 2023-06-14 PROCEDURE — 85025 COMPLETE CBC W/AUTO DIFF WBC: CPT

## 2023-06-14 PROCEDURE — 93005 ELECTROCARDIOGRAM TRACING: CPT

## 2023-06-14 PROCEDURE — 86900 BLOOD TYPING SEROLOGIC ABO: CPT | Performed by: EMERGENCY MEDICINE

## 2023-06-14 PROCEDURE — 80053 COMPREHEN METABOLIC PANEL: CPT | Performed by: EMERGENCY MEDICINE

## 2023-06-14 PROCEDURE — 99215 OFFICE O/P EST HI 40 MIN: CPT | Performed by: OBSTETRICS & GYNECOLOGY

## 2023-06-14 PROCEDURE — 86900 BLOOD TYPING SEROLOGIC ABO: CPT

## 2023-06-14 PROCEDURE — 96361 HYDRATE IV INFUSION ADD-ON: CPT

## 2023-06-14 PROCEDURE — 86850 RBC ANTIBODY SCREEN: CPT | Performed by: EMERGENCY MEDICINE

## 2023-06-14 PROCEDURE — 96360 HYDRATION IV INFUSION INIT: CPT

## 2023-06-14 PROCEDURE — 86901 BLOOD TYPING SEROLOGIC RH(D): CPT | Performed by: EMERGENCY MEDICINE

## 2023-06-14 PROCEDURE — 3074F SYST BP LT 130 MM HG: CPT | Performed by: OBSTETRICS & GYNECOLOGY

## 2023-06-14 PROCEDURE — 93010 ELECTROCARDIOGRAM REPORT: CPT

## 2023-06-14 PROCEDURE — 76830 TRANSVAGINAL US NON-OB: CPT | Performed by: EMERGENCY MEDICINE

## 2023-06-14 PROCEDURE — 85025 COMPLETE CBC W/AUTO DIFF WBC: CPT | Performed by: EMERGENCY MEDICINE

## 2023-06-14 PROCEDURE — 76856 US EXAM PELVIC COMPLETE: CPT | Performed by: EMERGENCY MEDICINE

## 2023-06-14 PROCEDURE — 99284 EMERGENCY DEPT VISIT MOD MDM: CPT

## 2023-06-14 PROCEDURE — 99213 OFFICE O/P EST LOW 20 MIN: CPT | Performed by: FAMILY MEDICINE

## 2023-06-14 PROCEDURE — 86850 RBC ANTIBODY SCREEN: CPT

## 2023-06-14 PROCEDURE — 86901 BLOOD TYPING SEROLOGIC RH(D): CPT

## 2023-06-14 PROCEDURE — 84484 ASSAY OF TROPONIN QUANT: CPT | Performed by: EMERGENCY MEDICINE

## 2023-06-14 PROCEDURE — 99285 EMERGENCY DEPT VISIT HI MDM: CPT

## 2023-06-14 PROCEDURE — 80053 COMPREHEN METABOLIC PANEL: CPT

## 2023-06-14 PROCEDURE — 85379 FIBRIN DEGRADATION QUANT: CPT | Performed by: EMERGENCY MEDICINE

## 2023-06-14 PROCEDURE — 3079F DIAST BP 80-89 MM HG: CPT | Performed by: OBSTETRICS & GYNECOLOGY

## 2023-06-14 RX ORDER — FLUTICASONE PROPIONATE 50 MCG
2 SPRAY, SUSPENSION (ML) NASAL DAILY
Qty: 1 EACH | Refills: 1 | Status: SHIPPED | OUTPATIENT
Start: 2023-06-14

## 2023-06-14 RX ORDER — RELUGOLIX, ESTRADIOL HEMIHYDRATE, AND NORETHINDRONE ACETATE 40; 1; .5 MG/1; MG/1; MG/1
1 TABLET, FILM COATED ORAL DAILY
Qty: 30 TABLET | Refills: 5 | Status: SHIPPED | OUTPATIENT
Start: 2023-06-14

## 2023-06-14 NOTE — ED INITIAL ASSESSMENT (HPI)
Pt was 's office when she felt dizzy and had a near syncopal episode. Thinks that her Hgb may be low. Dr. Jeannie Meyer pelvis US due to abd and hx of fibroids.  Menstrual cycle began today and is heavier than normal.

## 2023-06-14 NOTE — PROGRESS NOTES
Video Visit    This visit is conducted using Telemedicine with live, interactive video and audio. Ghassan Jaime  verbally consents to a Video visit. Patient understands and accepts financial responsibility for any deductible, co-insurance and/or co-pays associated with this service. Duration of the service: 10: 37  minutes      Summary of topics discussed:     Sinus problems: She reports lately she has had sinus and ear problems. She has had increased green mucous coming otu of her nose, worse in the mornings. Her ears feels clogged and had to go to the [facility] to have her ears irrigated and afterwards she can hear so much better. She denies any cough, congestions, or cold symptoms. She has no itchy or watery eyes, no scratchy throat or ear pain. She has been using Flonase without relief. Is not on any allergy pill right now. Obesity: Pt has been trying to lose weight and feels like she is not making any progress. She was 312 lbs in 4/2023 in office, states she has not weighed herself lately, but thinks she is about the same weight today. She has implemented intermittent fasting, no eating after 7pm.  She is eating more fruits and vegetables. Once her work schedule is figured out, she plans to work out more. She would like a referral to the weight loss clinic. ROS: as stated per HPI  Physical Exam: Exam is limited due to no face to face visit today. Pt is awake and alert, does not appear to be in acute distress today. Unable to examine HEENT given limitations of video visit.       Assessment/Plan:  1) Allergic rhinitis  - suspect underlying allergies causing sinus and ear symptoms  - has been using Flonase daily  - recommend to start daily PO antihistamine and regular saline sinus rinse  - pt requesting referral to ENT (Dr. Lindsey Bynum)    2) Morbid obesity  - cont regular exercise and healthy diet changes  - referral to Weight loss clinic      Orders Placed This [address] [facility] - [address] [facility] [address], [address]      ENT Referral - In [place], [place]

## 2023-06-14 NOTE — PATIENT INSTRUCTIONS
Minimally invasive gynecological surgeons who specializes in complex pelvic surgery  Dr. Franklin Griffin, Dr. Chuck Rosenthal [phone]    Dr. Crys Christianson [phone]    Dr. Carroll Seals, [place]  [phone]    Dr. Antonia Ghotra  Telephone [phone]    Dr. Delano Brewer [phone]    Dr. David Yang   Telephone [phone]    Dr. Colleen Peng [phone]    Dr. Alfie Moscoso [phone]    Dr. Melissa Fay   Telephone [phone]

## 2023-06-16 ENCOUNTER — TELEPHONE (OUTPATIENT)
Dept: OBGYN CLINIC | Facility: CLINIC | Age: 33
End: 2023-06-16

## 2023-06-16 NOTE — TELEPHONE ENCOUNTER
Spoke to patient and states she is still light headed but feeling better. US results discussed; phone number for central scheduling given so patient can schedule MRI appointment. Patient has not contacted Dr. Nicole Mccord office because she was not sure if they take her insurance. Patient advised to contact them directly and ask if they take her insurance or not and schedule appointment. Phone numbers were sent via [employer] on her after visit summary. Patient has not contacted pharmacy about her medication. Is aware we initiated a PA but that we are still waiting to hear back. We will update her once we hear back. Patient verbalized understanding; no further questions.

## 2023-06-16 NOTE — TELEPHONE ENCOUNTER
Ramonita Rehman MD  P [facility] [place] Nurse  Attempted to contact patient. No answer. Left message. Please contact patient and inquire about how she is doing following her emergency room department visit. Pelvic ultrasound reviewed. Noted for enlarged uterus. Multiple large uterine fibroids. Patient has been referred to minimally invasive gynecological surgery. I would recommend for the patient to also complete her ordered pelvic MRI. I also wanted to follow-up in regards to the patient's medication which has recently been ordered and I believe is waiting for approval of her prior authorization. I wanted to ask if patient was able to get in contact with Dr. Mariel Dash office. Thank you.

## 2023-06-28 ENCOUNTER — TELEPHONE (OUTPATIENT)
Dept: OBGYN CLINIC | Facility: CLINIC | Age: 33
End: 2023-06-28

## 2023-07-06 ENCOUNTER — TELEPHONE (OUTPATIENT)
Dept: HEMATOLOGY/ONCOLOGY | Facility: HOSPITAL | Age: 33
End: 2023-07-06

## 2023-07-06 ENCOUNTER — NURSE ONLY (OUTPATIENT)
Dept: HEMATOLOGY/ONCOLOGY | Facility: HOSPITAL | Age: 33
End: 2023-07-06
Attending: INTERNAL MEDICINE
Payer: MEDICAID

## 2023-07-06 ENCOUNTER — TELEPHONE (OUTPATIENT)
Dept: OBGYN CLINIC | Facility: CLINIC | Age: 33
End: 2023-07-06

## 2023-07-06 DIAGNOSIS — D25.9 UTERINE LEIOMYOMA, UNSPECIFIED LOCATION: ICD-10-CM

## 2023-07-06 DIAGNOSIS — D50.0 IRON DEFICIENCY ANEMIA DUE TO CHRONIC BLOOD LOSS: ICD-10-CM

## 2023-07-06 DIAGNOSIS — N93.9 ABNORMAL UTERINE BLEEDING (AUB): ICD-10-CM

## 2023-07-06 DIAGNOSIS — N92.0 MENORRHAGIA WITH REGULAR CYCLE: ICD-10-CM

## 2023-07-06 DIAGNOSIS — D50.0 IRON DEFICIENCY ANEMIA DUE TO CHRONIC BLOOD LOSS: Primary | ICD-10-CM

## 2023-07-06 LAB
BASOPHILS # BLD AUTO: 0.01 X10(3) UL (ref 0–0.2)
BASOPHILS NFR BLD AUTO: 0.2 %
DEPRECATED HBV CORE AB SER IA-ACNC: 3.6 NG/ML
EOSINOPHIL # BLD AUTO: 0.08 X10(3) UL (ref 0–0.7)
EOSINOPHIL NFR BLD AUTO: 1.7 %
ERYTHROCYTE [DISTWIDTH] IN BLOOD BY AUTOMATED COUNT: 18.8 %
HCT VFR BLD AUTO: 22.8 %
HGB BLD-MCNC: 6.8 G/DL
IMM GRANULOCYTES # BLD AUTO: 0.01 X10(3) UL (ref 0–1)
IMM GRANULOCYTES NFR BLD: 0.2 %
IRON SATN MFR SERPL: 3 %
IRON SERPL-MCNC: 11 UG/DL
LYMPHOCYTES # BLD AUTO: 1.13 X10(3) UL (ref 1–4)
LYMPHOCYTES NFR BLD AUTO: 23.6 %
MCH RBC QN AUTO: 22.9 PG (ref 26–34)
MCHC RBC AUTO-ENTMCNC: 29.8 G/DL (ref 31–37)
MCV RBC AUTO: 76.8 FL
MONOCYTES # BLD AUTO: 0.43 X10(3) UL (ref 0.1–1)
MONOCYTES NFR BLD AUTO: 9 %
NEUTROPHILS # BLD AUTO: 3.12 X10 (3) UL (ref 1.5–7.7)
NEUTROPHILS # BLD AUTO: 3.12 X10(3) UL (ref 1.5–7.7)
NEUTROPHILS NFR BLD AUTO: 65.3 %
PLATELET # BLD AUTO: 282 10(3)UL (ref 150–450)
RBC # BLD AUTO: 2.97 X10(6)UL
TIBC SERPL-MCNC: 383 UG/DL (ref 240–450)
TRANSFERRIN SERPL-MCNC: 257 MG/DL (ref 200–360)
WBC # BLD AUTO: 4.8 X10(3) UL (ref 4–11)

## 2023-07-06 PROCEDURE — 83550 IRON BINDING TEST: CPT

## 2023-07-06 PROCEDURE — 83540 ASSAY OF IRON: CPT

## 2023-07-06 PROCEDURE — 36415 COLL VENOUS BLD VENIPUNCTURE: CPT

## 2023-07-06 PROCEDURE — 85025 COMPLETE CBC W/AUTO DIFF WBC: CPT

## 2023-07-06 PROCEDURE — 82728 ASSAY OF FERRITIN: CPT

## 2023-07-06 NOTE — TELEPHONE ENCOUNTER
Called Karyn to let her know that her hemoglobin was 6.8. I said that Dr. Soraya Wesley wants her to get another 4 doses of Feraheme. Told her we would like her to come in today for an iron infusion. She said she works today and tomorrow so she wants to come in on Monday. She will be set up for Feraheme x4 more doses. Recheck labs in 1 month after last dose. She will be set up for Monday.

## 2023-07-06 NOTE — TELEPHONE ENCOUNTER
Received a fax from [employer] that the MRI with contrast only was not approved. They are requesting MRI with and without contrast. Order updated. Radiology notified; order updated with appointment. New order faxed to [id number] with their response. Fax confirmation received.

## 2023-07-10 ENCOUNTER — OFFICE VISIT (OUTPATIENT)
Dept: HEMATOLOGY/ONCOLOGY | Facility: HOSPITAL | Age: 33
End: 2023-07-10
Attending: INTERNAL MEDICINE
Payer: MEDICAID

## 2023-07-10 VITALS
OXYGEN SATURATION: 100 % | HEART RATE: 85 BPM | WEIGHT: 293 LBS | BODY MASS INDEX: 47 KG/M2 | TEMPERATURE: 98 F | RESPIRATION RATE: 20 BRPM | SYSTOLIC BLOOD PRESSURE: 116 MMHG | DIASTOLIC BLOOD PRESSURE: 65 MMHG

## 2023-07-10 DIAGNOSIS — D50.0 IRON DEFICIENCY ANEMIA DUE TO CHRONIC BLOOD LOSS: Primary | ICD-10-CM

## 2023-07-10 PROCEDURE — 96374 THER/PROPH/DIAG INJ IV PUSH: CPT

## 2023-07-10 RX ORDER — AMOXICILLIN 875 MG/1
875 TABLET, COATED ORAL 2 TIMES DAILY
COMMUNITY
Start: 2023-06-19

## 2023-07-10 NOTE — PROGRESS NOTES
Pt here for iron infusion. Arrives Ambulating independently, accompanied by Other self           Modifications in dose or schedule: No     Frequency of blood return and site check throughout administration: Prior to administration   Discharged to Home, Ambulating independently, accompanied by: Other self    Outpatient Oncology Care Plan  Problem list:  anemia  Problems related to:    disease/disease progression  Interventions:  administered iron  Expected outcomes:  optimal lab values  Progress towards outcome:  making progress    Education Record    Learner:  Patient  Barriers / Limitations:  None  Method:  Brief focused  Outcome:  Shows understanding  Comments:observed pt 30 min post infusion. Pt tolerated infusion. No c/o. VSS. See flowsheet    Pt tolerated feraheme infusion without incident. Left in stable condition after 30 minute observation period. VSS. Appt made for remaining 3 iron infusion visits.

## 2023-07-11 ENCOUNTER — PATIENT MESSAGE (OUTPATIENT)
Dept: OBGYN CLINIC | Facility: CLINIC | Age: 33
End: 2023-07-11

## 2023-07-11 NOTE — TELEPHONE ENCOUNTER
From: Carissa Juarez  To: Theo Morgan MD  Sent: 7/11/2023 4:27 PM CDT  Subject: Scheduling surgery    Good evening Jose had sometime to think about what I want and Jose decided I would like to do the myomectomy. Michelle Fajardo also done research on the [place] and Michelle Fajardo decided not to take it because of the side effects it may cause. Please let me know if we can get this scheduled for the first week of November.

## 2023-07-12 ENCOUNTER — OFFICE VISIT (OUTPATIENT)
Dept: FAMILY MEDICINE CLINIC | Facility: CLINIC | Age: 33
End: 2023-07-12
Payer: MEDICAID

## 2023-07-12 VITALS
RESPIRATION RATE: 18 BRPM | WEIGHT: 293 LBS | SYSTOLIC BLOOD PRESSURE: 132 MMHG | BODY MASS INDEX: 46 KG/M2 | OXYGEN SATURATION: 98 % | HEART RATE: 95 BPM | DIASTOLIC BLOOD PRESSURE: 78 MMHG | TEMPERATURE: 99 F

## 2023-07-12 DIAGNOSIS — D25.9 UTERINE LEIOMYOMA, UNSPECIFIED LOCATION: ICD-10-CM

## 2023-07-12 DIAGNOSIS — D50.9 IRON DEFICIENCY ANEMIA, UNSPECIFIED IRON DEFICIENCY ANEMIA TYPE: Primary | ICD-10-CM

## 2023-07-12 DIAGNOSIS — N92.0 MENORRHAGIA WITH REGULAR CYCLE: ICD-10-CM

## 2023-07-12 DIAGNOSIS — H69.93 EUSTACHIAN TUBE DYSFUNCTION, BILATERAL: ICD-10-CM

## 2023-07-12 PROCEDURE — 3078F DIAST BP <80 MM HG: CPT | Performed by: FAMILY MEDICINE

## 2023-07-12 PROCEDURE — 99214 OFFICE O/P EST MOD 30 MIN: CPT | Performed by: FAMILY MEDICINE

## 2023-07-12 PROCEDURE — 3075F SYST BP GE 130 - 139MM HG: CPT | Performed by: FAMILY MEDICINE

## 2023-07-12 NOTE — TELEPHONE ENCOUNTER
Recommend for patient to complete pelvic MRI as ordered. Did the patient make a consult visit with minimally invasive gynecological surgery?

## 2023-07-13 NOTE — TELEPHONE ENCOUNTER
I can send over the surgical request for November. However, I will need MRI prior to surgery therefore please remind patient to schedule and keep appointment for MRI as this will be the imaging needed to plan her surgery. Thank you!

## 2023-07-15 ENCOUNTER — PATIENT MESSAGE (OUTPATIENT)
Dept: OBGYN CLINIC | Facility: CLINIC | Age: 33
End: 2023-07-15

## 2023-07-15 ENCOUNTER — TELEPHONE (OUTPATIENT)
Dept: OBGYN CLINIC | Facility: CLINIC | Age: 33
End: 2023-07-15

## 2023-07-16 NOTE — TELEPHONE ENCOUNTER
Late entry. Contacted patient at around 5:30 PM.  Discussed with patient plan for surgical management. Recommend for patient to strongly consider medical management for abnormal uterine bleeding. Patient is severely anemic and is likely to continue with her anemia unless her abnormal uterine bleeding improves. Therefore, recommend for patient to strongly consider starting Myfembree. Patient states that she does have the medication at home. However, she was concerned about possible side effects and risk of high blood pressure. Patient does not have a diagnosis of high blood pressure. Discussed with patient that side effects are possible but hopefully minimal.  Discussed with patient that the benefits of medication outweigh the risk at this time. Recommend for patient to start Myfembree so that she may possibly control her abnormal uterine bleeding which would then subsequently improve her anemia and would also make her a better surgical candidate including myomectomy. I discussed with patient that myomectomy would not be recommended if she continues to have a severe anemia. I also discussed with patient that Myfembree has the potential of possibly decreasing her uterine size which would further make myomectomy a more desirable surgical option after starting Myfembree. Patient agreeable to start medication. Patient advised to keep her appointment for MRI to further evaluate her uterine fibroids and assistance surgical planning. I discussed the patient that surgery might indicate a vertical abdominal incision versus Pfannenstiel. However, I will not know final incision until the time of surgery. Patient agreeable for either kind of incision. I also encouraged the patient to seek out surgical consultation with minimally invasive gynecological surgery as referred. I will be providing the patient the contact information for the minimal invasive GYN surgeon.   I encouraged the patient to have a consult visit and decide if a minimally invasive surgical approach would be most ideal.  I strongly encouraged the patient to consider minimally invasive GYN surgery due to her history of severe anemia. All questions and concerns were addressed. Patient agreeable to plan.       Cash Scales MD   [facility] - OBGYN

## 2023-07-17 ENCOUNTER — OFFICE VISIT (OUTPATIENT)
Dept: HEMATOLOGY/ONCOLOGY | Facility: HOSPITAL | Age: 33
End: 2023-07-17
Attending: INTERNAL MEDICINE
Payer: MEDICAID

## 2023-07-17 VITALS
HEART RATE: 99 BPM | SYSTOLIC BLOOD PRESSURE: 119 MMHG | OXYGEN SATURATION: 100 % | BODY MASS INDEX: 47 KG/M2 | RESPIRATION RATE: 18 BRPM | TEMPERATURE: 98 F | DIASTOLIC BLOOD PRESSURE: 79 MMHG | WEIGHT: 293 LBS

## 2023-07-17 DIAGNOSIS — D50.0 IRON DEFICIENCY ANEMIA DUE TO CHRONIC BLOOD LOSS: Primary | ICD-10-CM

## 2023-07-17 PROCEDURE — 96374 THER/PROPH/DIAG INJ IV PUSH: CPT

## 2023-07-17 NOTE — PROGRESS NOTES
Education Record    Learner:  Patient    Disease / Raj Morejon deficiency anemia due to chronic blood loss      Barriers / Limitations:  None   Comments:    Method:  Brief focused   Comments:    General Topics:  Infection, Medication, Pain, Precautions, Procedure, Side effects and symptom management, Plan of care reviewed, and Fall risk and prevention   Comments:    Outcome:  Shows understanding   Comments: Tolerated well her Iron infusion. Observed for 30 min.  VSS

## 2023-07-19 NOTE — TELEPHONE ENCOUNTER
From: Helena Mao  Sent: 7/18/2023 10:46 AM CDT  To: [facility] [place] Nurse  Subject: referral to minimally invasive gynecological surgeon    Good morning I just wanted to let you know I reached out to Dr. Francella Snellen office and they do not accept my insurance

## 2023-07-20 ENCOUNTER — TELEPHONE (OUTPATIENT)
Dept: OBGYN CLINIC | Facility: CLINIC | Age: 33
End: 2023-07-20

## 2023-07-20 DIAGNOSIS — N93.9 ABNORMAL UTERINE BLEEDING: Primary | ICD-10-CM

## 2023-07-20 DIAGNOSIS — D25.9 UTERINE LEIOMYOMA, UNSPECIFIED LOCATION: ICD-10-CM

## 2023-07-20 DIAGNOSIS — D64.9 ANEMIA, UNSPECIFIED TYPE: ICD-10-CM

## 2023-07-20 DIAGNOSIS — N92.0 MENORRHAGIA WITH REGULAR CYCLE: ICD-10-CM

## 2023-07-20 NOTE — TELEPHONE ENCOUNTER
----- Message from Lake Jimenez MD sent at 7/15/2023  9:07 PM CDT -----  Regarding: please schedule surgery  Patient desires first week of November. Surgeon: Dr. Lake Jimenez   Assistant: Yes (MD assist)     Type of Admit: Surgery Admit Inpatient    Procedure Location: Main OR    PreOp Dx: Abnormal uterine bleeding, Uterine fibroid, Menorrhagia with regular cycles, Anemia     Procedure: abdominal myomectomy     Anesthesia: General    Special Equipment/Comments: Alton retractor, cem O retractor (in room, not opened). 0 vicryl suture non pop offs x 4. Electrocautery pen. Vasopressin diluted solution with 20 units of vasopressin mixed with 100 mL of normal saline.     Antibiotics: Initiate antibiotics per [facility] adult preoperative prophylactic antibiotic protocol   Pre Op Orders: initiate my Pre-op standing orders, if none exist please use [employer] Order   Labs: Type and screen     Medical clearance: Yes

## 2023-07-20 NOTE — TELEPHONE ENCOUNTER
Surgery scheduled for 11/27/23 730  Post op   Future Appointments   Date Time Provider Marco Knowlesi   7/24/2023  2:00 PM Tammi Maddox [address] [place] [address]   7/31/2023  2:00 PM Siobhan Henderson [address] [place] [address]   8/3/2023 11:30 AM 1404 [facility] MR AMY4 (3T WIDE) [facility]4 [facility] MRI Naz Gray   8/23/2023  9:20 AM Basil Hinton MD EMG 30 [facility] [place]   11/28/2023  3:00 PM ANNE Foley EMGWEI EMG [facility]   12/11/2023  9:00 AM Saw Meyer MD EMG OB/GYN N [facility] [place]     Orders entered  Added to calendar  SA - Dr. Stephy Scott to assist  Letter sent to PCP  PA - no auth needed  Reference Number  [id number]

## 2023-07-24 ENCOUNTER — OFFICE VISIT (OUTPATIENT)
Dept: HEMATOLOGY/ONCOLOGY | Facility: HOSPITAL | Age: 33
End: 2023-07-24
Attending: INTERNAL MEDICINE
Payer: MEDICAID

## 2023-07-24 VITALS
TEMPERATURE: 98 F | RESPIRATION RATE: 18 BRPM | WEIGHT: 293 LBS | SYSTOLIC BLOOD PRESSURE: 123 MMHG | BODY MASS INDEX: 47 KG/M2 | DIASTOLIC BLOOD PRESSURE: 74 MMHG | OXYGEN SATURATION: 100 % | HEART RATE: 66 BPM

## 2023-07-24 DIAGNOSIS — D50.0 IRON DEFICIENCY ANEMIA DUE TO CHRONIC BLOOD LOSS: Primary | ICD-10-CM

## 2023-07-24 PROCEDURE — 96374 THER/PROPH/DIAG INJ IV PUSH: CPT

## 2023-07-24 NOTE — PROGRESS NOTES
Education Record    Learner:  Patient    Disease / Diagnosis: Iron Deficiency Anemia due to chronic blood loss    Barriers / Limitations:  None   Comments:    Method:  Brief focused and Reinforcement   Comments:    General Topics:  Plan of care reviewed   Comments:    Outcome:  Shows understanding   Comments:  Feraheme infused without any complications. Observed for half hour after infusion. Vital signs taken at the end of the 30-minute observation. Patient denied any complaints/issues. Discharged in good condition. Advised to call for any questions/concerns/issues.

## 2023-07-31 ENCOUNTER — OFFICE VISIT (OUTPATIENT)
Dept: HEMATOLOGY/ONCOLOGY | Facility: HOSPITAL | Age: 33
End: 2023-07-31
Attending: INTERNAL MEDICINE
Payer: MEDICAID

## 2023-07-31 VITALS
BODY MASS INDEX: 46 KG/M2 | SYSTOLIC BLOOD PRESSURE: 121 MMHG | WEIGHT: 293 LBS | RESPIRATION RATE: 16 BRPM | HEART RATE: 58 BPM | DIASTOLIC BLOOD PRESSURE: 80 MMHG | TEMPERATURE: 98 F | OXYGEN SATURATION: 9 %

## 2023-07-31 DIAGNOSIS — D50.0 IRON DEFICIENCY ANEMIA DUE TO CHRONIC BLOOD LOSS: Primary | ICD-10-CM

## 2023-07-31 PROCEDURE — 96374 THER/PROPH/DIAG INJ IV PUSH: CPT

## 2023-07-31 NOTE — PROGRESS NOTES
Pt here for iron infusion. Arrives Ambulating independently, accompanied by Other self           Modifications in dose or schedule: No     Frequency of blood return and site check throughout administration: Prior to administration   Discharged to Home, Ambulating independently, accompanied by: Other self    Outpatient Oncology Care Plan  Problem list:  anemia  Problems related to:    disease/disease progression  Interventions:  administered iron  Expected outcomes:  optimal lab values  Progress towards outcome:  making progress    Education Record    Learner:  Patient  Barriers / Limitations:  None  Method:  Brief focused  Outcome:  Shows understanding  Comments:observed pt 30 min post infusion. Pt tolerated infusion. No c/o. VSS. See flowsheet    Pt tolerated final feraheme infusion of order set. Appt requested for 1 month for labs. Left in stable condition.

## 2023-08-03 ENCOUNTER — HOSPITAL ENCOUNTER (OUTPATIENT)
Dept: MRI IMAGING | Facility: HOSPITAL | Age: 33
Discharge: HOME | End: 2023-08-03
Attending: OBSTETRICS & GYNECOLOGY
Payer: MEDICAID

## 2023-08-03 DIAGNOSIS — D25.9 UTERINE LEIOMYOMA, UNSPECIFIED LOCATION: ICD-10-CM

## 2023-08-03 DIAGNOSIS — D50.0 IRON DEFICIENCY ANEMIA DUE TO CHRONIC BLOOD LOSS: ICD-10-CM

## 2023-08-03 DIAGNOSIS — N92.0 MENORRHAGIA WITH REGULAR CYCLE: ICD-10-CM

## 2023-08-03 DIAGNOSIS — N93.9 ABNORMAL UTERINE BLEEDING (AUB): ICD-10-CM

## 2023-08-03 PROCEDURE — A9575 INJ GADOTERATE MEGLUMI 0.1ML: HCPCS | Performed by: OBSTETRICS & GYNECOLOGY

## 2023-08-03 PROCEDURE — 72197 MRI PELVIS W/O & W/DYE: CPT | Performed by: OBSTETRICS & GYNECOLOGY

## 2023-08-03 RX ORDER — GADOTERATE MEGLUMINE 376.9 MG/ML
20 INJECTION INTRAVENOUS
Status: COMPLETED | OUTPATIENT
Start: 2023-08-03 | End: 2023-08-03

## 2023-08-03 RX ADMIN — GADOTERATE MEGLUMINE 20 ML: 376.9 INJECTION INTRAVENOUS at 12:06:00

## 2023-08-09 ENCOUNTER — OFFICE VISIT (OUTPATIENT)
Dept: INTERNAL MEDICINE CLINIC | Facility: CLINIC | Age: 33
End: 2023-08-09
Payer: MEDICAID

## 2023-08-09 VITALS
WEIGHT: 293 LBS | HEART RATE: 70 BPM | SYSTOLIC BLOOD PRESSURE: 120 MMHG | HEIGHT: 67.5 IN | DIASTOLIC BLOOD PRESSURE: 80 MMHG | RESPIRATION RATE: 18 BRPM | BODY MASS INDEX: 45.45 KG/M2

## 2023-08-09 DIAGNOSIS — Z51.81 THERAPEUTIC DRUG MONITORING: ICD-10-CM

## 2023-08-09 DIAGNOSIS — D50.0 IRON DEFICIENCY ANEMIA DUE TO CHRONIC BLOOD LOSS: ICD-10-CM

## 2023-08-09 DIAGNOSIS — R06.83 SNORING: ICD-10-CM

## 2023-08-09 DIAGNOSIS — E66.01 MORBID OBESITY (HCC): Primary | ICD-10-CM

## 2023-08-09 PROCEDURE — 3074F SYST BP LT 130 MM HG: CPT | Performed by: NURSE PRACTITIONER

## 2023-08-09 PROCEDURE — 3008F BODY MASS INDEX DOCD: CPT | Performed by: NURSE PRACTITIONER

## 2023-08-09 PROCEDURE — 99204 OFFICE O/P NEW MOD 45 MIN: CPT | Performed by: NURSE PRACTITIONER

## 2023-08-09 PROCEDURE — 3079F DIAST BP 80-89 MM HG: CPT | Performed by: NURSE PRACTITIONER

## 2023-08-09 RX ORDER — PHENTERMINE HYDROCHLORIDE 15 MG/1
15 CAPSULE ORAL EVERY MORNING
Qty: 30 CAPSULE | Refills: 1 | Status: SHIPPED | OUTPATIENT
Start: 2023-08-09

## 2023-08-09 NOTE — PATIENT INSTRUCTIONS
Welcome to the [facility] Management Program!!  Thank you for placing your trust in our health care team, I look forward to working with you along this journey to better health! Next steps:     1.  Schedule a personal nutrition consultation with one of our registered dieticians. Bring along your food journal (3 days minimum). See journal options below. 2.  Fill your prescribed medication and take as discussed and prescribed: phentermine 15mg      Please try to work on the following dietary changes this first month:  Daily protein recommendation to start:  grams  Daily carbohydrate: <170g  Daily calories: 2,100-2,200  1. Drink water with meals and throughout the day, cut down on soda and/or juice if consumed. Consider flavored water options like Bubbly, Spindrift, Hint and Cinthya. 2.  Eat breakfast daily and focus on having protein with each meal, examples include: greek yogurt, cottage cheese, hard boiled egg, whole grain toast with peanut butter. 3.  Reduce refined carbohydrates and sugars which includes items such as sweets, as well as rice, pasta, and bread and make sure to choose whole grain options when having them with just 1 serving per meal about the size of your inner palm. 4.  Consume non starchy veggies daily working towards making them a good 50% of your daily food intake. Add them to lunch and dinner consistently. 5.  Optional can start a daily probiotic: VSL#3, (order on line at www.vsl3. com). Take 1 capsule daily with water for 30 days, then reduce to 1 every other day (this will reduce the cost). Capsules can be left out for 2 weeks, but then must be refrigerated. Please download svetlana My Fitness Anyi Scot! Or Net Diary to monitor daily dietary intake and you will be able to see if you are eating the right amount of calories or too much or too little which would hinder weight loss. Additionally this will help to see your daily carbohydrate and protein intake.  When you set the ravin up choose 1-2 lbs/week as a goal.  Keeping a paper food journal is an option as well to remain accountable for your choices- this is the start to mindful eating! A low calorie diet has been consistently shown to support weight loss. Continue or start exercising to help establish a routine. If not already exercising begin with 1 day and progress as able with long-term goal of 30 minutes 5 days a week at a minimum. Meditation daily can help manage and control stress. Chronic stress can make weight loss difficult. Exercising is one way to help with stress, but meditation using the CALM Ravin or another comparable alternative can be done in your home or place of work with little time commitment. This Ravin can also help work on behavior change and improve sleep. Check out the segment under Calm Masterclass and listen to The 4 Pillars of Health. A great way to begin learning about the foundation of lifestyle with practical tips to use in your every day. Check out www.yourweightmatters. org blog for continued daily support and education along this weight loss journey! Patient Resources:     Personal Training/Fitness Classes/Health Coaching     Michelle Chowdhury and Parson Sophiaside @ [email or web address]/ Full fitness center with group fitness and personal training. Discount available as client of [facility]. Health Coaching and Personal Training with Jerald Gregorio at our [facility]- individual weekly coaching with option to add personal training and small group fitness classes targeted at weight loss- [phone] and/or email @ [email or web address]  360FIT [place] [email or web address] Group Fitness [phone] and/or email Abigail Samayoa at [email or web address]  [address] with multiple locations: Aetna (www.eeden), Eat The Frog Fitness (www.CyberDefender. com), Delta Life Fitness (www.Fit Steps. Vertex Pharmaceuticals), The Exercise  (www.exercisecoach.Vertex Pharmaceuticals)     Online Fitness  Fitness  on Whole Foods in 10 DVD series- www. ltowr53KSC. Vertex [employer]aceuticals  Sit and Be Fit - Chair exercise series Www.sitandbefit. org  Hip Hop Fit with Vamsi Art at www.hiphopfit. net     Apps for on the FindMySong 7 Minute Workout (orange box with white 7) - free on the go HIIT training ravin  Peloton Ravin @ Aria Systems     Nutrition Trackers and Tools  LoseIT! And My Fitness Pal apps and on line for tracking nutrition  NOOM - virtual health coaching  FitFoundation (healthy meals on the go) in [employer] @ CytoPherx fojbexxztcaic6b. Nelly Cooks MD @ www.bistromd.com and Melvin Anthony (keto and low carb plans recommended) @ www. ABQZEF46.OFS, Metabolic Meals @ wwwBubbleNoiseals. com - individual prepared meals to go  [facility], MetroMile, [employer] Machines, Every Plate, Agora Mobile- on line meal delivery programs for preparation at home  AK Qteros in [place] for homemade meals to go @ wwwCieslok Media  Diet Doctor @ www. dietdoctor. Vertex Pharmaceuticals - low carb swaps  Yummly - meal prep and planning ravin (www.yummly. com)     Stress Management/Behavior/Mindful Eating  CALM meditation ravin (www.calm. Vertex Pharmaceuticals)  Headspace  Am I Hungry? Mindful eating virtual  ravin  Www.yourweightmatters. org - Obesity Action Coalition sponsored Blog posts daily  Motivation ravin (black box with white \")- daily supportive messages sent to your phone     Books/Video Education/Podcasts  Mindless Eating by Sera Rojas  Why We Get Sick by Maggie Granados (a book about insulin resistance)  Atomic Habits by Diana Tanner (a book about taking small steps to promote greater behavior change)   Can't Hurt Me by Taylor Eaton (a book exploring the power of discipline in achieving your goals)  The End of Dieting: How to Live for Life by Dr. Deirdre Brito M.D. or listen to The [facility] Episode 61:  Understanding \"Nutritarian\" Eating w/Dr. Deirdre Brito  Your Body in Balance: The World [employer] of Food, Hormones, and Health by Dr. Tom Onofre  The Menopause Diet Plan by [facility] AT [facility]  The Complete Guide to fasting by Dr. Cierra Hendrix, [facility] by Bernardino Yepez, Ph.D, R.D. Weight Loss Surgery Will Not Treat Food Addiction by Deepa Burciaga Ph.D  The [address] [place] [address] on plant based nutrition  Fed Up - documentary about obesity (Free on [place])  The Truth About Sugar - documentary on sugar (Free on CarePayment, https://youOdyssey Mobile Interactionu. be/8E7vsvhDU2n)  The Dr. Hyde  by Dr. Petar Beltran MD  Fitlosophy Fitspiration - journal to better health (found at [employer] in fitness aisle)  What Happened to You?- a look at the impact trauma has on behavior written by Viridiana Harvey and Dr. Mohit Kaplan Again by Daniela Vaz - discovering your true self after trauma  Grupo Mcclellan talk on [employer], The Call to Courage  Podcasts: The Exam Room by the Physician's Committee, Nutrition Facts by Dr. Dannie Jacobs    We are here to support you with weight loss, but please remember that you still need your primary care provider for your routine health maintenance.

## 2023-08-30 ENCOUNTER — NURSE ONLY (OUTPATIENT)
Dept: HEMATOLOGY/ONCOLOGY | Facility: HOSPITAL | Age: 33
End: 2023-08-30
Attending: INTERNAL MEDICINE
Payer: MEDICAID

## 2023-08-30 DIAGNOSIS — D50.0 IRON DEFICIENCY ANEMIA DUE TO CHRONIC BLOOD LOSS: Primary | ICD-10-CM

## 2023-08-30 LAB
BASOPHILS # BLD AUTO: 0.02 X10(3) UL (ref 0–0.2)
BASOPHILS NFR BLD AUTO: 0.4 %
DEPRECATED HBV CORE AB SER IA-ACNC: 23.2 NG/ML
EOSINOPHIL # BLD AUTO: 0.08 X10(3) UL (ref 0–0.7)
EOSINOPHIL NFR BLD AUTO: 1.5 %
ERYTHROCYTE [DISTWIDTH] IN BLOOD BY AUTOMATED COUNT: 18.8 %
HCT VFR BLD AUTO: 28.9 %
HGB BLD-MCNC: 9 G/DL
IMM GRANULOCYTES # BLD AUTO: 0.02 X10(3) UL (ref 0–1)
IMM GRANULOCYTES NFR BLD: 0.4 %
IRON SATN MFR SERPL: 11 %
IRON SERPL-MCNC: 33 UG/DL
LYMPHOCYTES # BLD AUTO: 1.68 X10(3) UL (ref 1–4)
LYMPHOCYTES NFR BLD AUTO: 30.9 %
MCH RBC QN AUTO: 24.9 PG (ref 26–34)
MCHC RBC AUTO-ENTMCNC: 31.1 G/DL (ref 31–37)
MCV RBC AUTO: 80.1 FL
MONOCYTES # BLD AUTO: 0.37 X10(3) UL (ref 0.1–1)
MONOCYTES NFR BLD AUTO: 6.8 %
NEUTROPHILS # BLD AUTO: 3.26 X10 (3) UL (ref 1.5–7.7)
NEUTROPHILS # BLD AUTO: 3.26 X10(3) UL (ref 1.5–7.7)
NEUTROPHILS NFR BLD AUTO: 60 %
PLATELET # BLD AUTO: 257 10(3)UL (ref 150–450)
RBC # BLD AUTO: 3.61 X10(6)UL
TIBC SERPL-MCNC: 299 UG/DL (ref 240–450)
TRANSFERRIN SERPL-MCNC: 201 MG/DL (ref 200–360)
WBC # BLD AUTO: 5.4 X10(3) UL (ref 4–11)

## 2023-08-30 PROCEDURE — 36415 COLL VENOUS BLD VENIPUNCTURE: CPT

## 2023-08-30 PROCEDURE — 85025 COMPLETE CBC W/AUTO DIFF WBC: CPT

## 2023-08-30 PROCEDURE — 83540 ASSAY OF IRON: CPT

## 2023-08-30 PROCEDURE — 83550 IRON BINDING TEST: CPT

## 2023-08-30 PROCEDURE — 82728 ASSAY OF FERRITIN: CPT

## 2023-08-31 ENCOUNTER — TELEPHONE (OUTPATIENT)
Dept: HEMATOLOGY/ONCOLOGY | Facility: HOSPITAL | Age: 33
End: 2023-08-31

## 2023-09-01 ENCOUNTER — APPOINTMENT (OUTPATIENT)
Dept: CT IMAGING | Facility: HOSPITAL | Age: 33
End: 2023-09-01
Attending: EMERGENCY MEDICINE
Payer: MEDICAID

## 2023-09-01 ENCOUNTER — HOSPITAL ENCOUNTER (EMERGENCY)
Facility: HOSPITAL | Age: 33
Discharge: HOME | End: 2023-09-01
Attending: EMERGENCY MEDICINE
Payer: MEDICAID

## 2023-09-01 VITALS
TEMPERATURE: 97 F | RESPIRATION RATE: 18 BRPM | SYSTOLIC BLOOD PRESSURE: 155 MMHG | HEART RATE: 75 BPM | HEIGHT: 67 IN | WEIGHT: 293 LBS | OXYGEN SATURATION: 100 % | BODY MASS INDEX: 45.99 KG/M2 | DIASTOLIC BLOOD PRESSURE: 87 MMHG

## 2023-09-01 DIAGNOSIS — N39.0 ACUTE UTI: ICD-10-CM

## 2023-09-01 DIAGNOSIS — R10.9 ABDOMINAL PAIN OF UNKNOWN ETIOLOGY: Primary | ICD-10-CM

## 2023-09-01 DIAGNOSIS — N93.8 DUB (DYSFUNCTIONAL UTERINE BLEEDING): ICD-10-CM

## 2023-09-01 DIAGNOSIS — D25.9 UTERINE LEIOMYOMA, UNSPECIFIED LOCATION: ICD-10-CM

## 2023-09-01 LAB
ALBUMIN SERPL-MCNC: 3.6 G/DL (ref 3.4–5)
ALBUMIN/GLOB SERPL: 1.3 {RATIO} (ref 1–2)
ALP LIVER SERPL-CCNC: 46 U/L
ALT SERPL-CCNC: 24 U/L
ANION GAP SERPL CALC-SCNC: 4 MMOL/L (ref 0–18)
ANTIBODY SCREEN: NEGATIVE
AST SERPL-CCNC: 13 U/L (ref 15–37)
B-HCG UR QL: NEGATIVE
BASOPHILS # BLD AUTO: 0.01 X10(3) UL (ref 0–0.2)
BASOPHILS NFR BLD AUTO: 0.2 %
BILIRUB SERPL-MCNC: 0.2 MG/DL (ref 0.1–2)
BILIRUB UR QL STRIP.AUTO: NEGATIVE
BUN BLD-MCNC: 8 MG/DL (ref 7–18)
CALCIUM BLD-MCNC: 8.3 MG/DL (ref 8.5–10.1)
CHLORIDE SERPL-SCNC: 109 MMOL/L (ref 98–112)
CLARITY UR REFRACT.AUTO: CLEAR
CO2 SERPL-SCNC: 28 MMOL/L (ref 21–32)
COLOR UR AUTO: COLORLESS
CREAT BLD-MCNC: 0.8 MG/DL
EGFRCR SERPLBLD CKD-EPI 2021: 100 ML/MIN/1.73M2 (ref 60–?)
EOSINOPHIL # BLD AUTO: 0.08 X10(3) UL (ref 0–0.7)
EOSINOPHIL NFR BLD AUTO: 1.3 %
ERYTHROCYTE [DISTWIDTH] IN BLOOD BY AUTOMATED COUNT: 19.1 %
GLOBULIN PLAS-MCNC: 2.8 G/DL (ref 2.8–4.4)
GLUCOSE BLD-MCNC: 121 MG/DL (ref 70–99)
GLUCOSE UR STRIP.AUTO-MCNC: NORMAL MG/DL
HCT VFR BLD AUTO: 25 %
HGB BLD-MCNC: 8 G/DL
IMM GRANULOCYTES # BLD AUTO: 0.02 X10(3) UL (ref 0–1)
IMM GRANULOCYTES NFR BLD: 0.3 %
KETONES UR STRIP.AUTO-MCNC: NEGATIVE MG/DL
LEUKOCYTE ESTERASE UR QL STRIP.AUTO: 500
LYMPHOCYTES # BLD AUTO: 1.92 X10(3) UL (ref 1–4)
LYMPHOCYTES NFR BLD AUTO: 31.3 %
MCH RBC QN AUTO: 25.2 PG (ref 26–34)
MCHC RBC AUTO-ENTMCNC: 32 G/DL (ref 31–37)
MCV RBC AUTO: 78.9 FL
MONOCYTES # BLD AUTO: 0.4 X10(3) UL (ref 0.1–1)
MONOCYTES NFR BLD AUTO: 6.5 %
NEUTROPHILS # BLD AUTO: 3.71 X10 (3) UL (ref 1.5–7.7)
NEUTROPHILS # BLD AUTO: 3.71 X10(3) UL (ref 1.5–7.7)
NEUTROPHILS NFR BLD AUTO: 60.4 %
NITRITE UR QL STRIP.AUTO: NEGATIVE
OSMOLALITY SERPL CALC.SUM OF ELEC: 292 MOSM/KG (ref 275–295)
PH UR STRIP.AUTO: 5.5 [PH] (ref 5–8)
PLATELET # BLD AUTO: 294 10(3)UL (ref 150–450)
POTASSIUM SERPL-SCNC: 3.5 MMOL/L (ref 3.5–5.1)
PROT SERPL-MCNC: 6.4 G/DL (ref 6.4–8.2)
RBC # BLD AUTO: 3.17 X10(6)UL
RBC #/AREA URNS AUTO: >10 /HPF
RH BLOOD TYPE: POSITIVE
SODIUM SERPL-SCNC: 141 MMOL/L (ref 136–145)
SP GR UR STRIP.AUTO: 1.01 (ref 1–1.03)
UROBILINOGEN UR STRIP.AUTO-MCNC: NORMAL MG/DL
WBC # BLD AUTO: 6.1 X10(3) UL (ref 4–11)

## 2023-09-01 PROCEDURE — 99285 EMERGENCY DEPT VISIT HI MDM: CPT

## 2023-09-01 PROCEDURE — 87086 URINE CULTURE/COLONY COUNT: CPT | Performed by: EMERGENCY MEDICINE

## 2023-09-01 PROCEDURE — 96375 TX/PRO/DX INJ NEW DRUG ADDON: CPT

## 2023-09-01 PROCEDURE — 86850 RBC ANTIBODY SCREEN: CPT | Performed by: EMERGENCY MEDICINE

## 2023-09-01 PROCEDURE — 74177 CT ABD & PELVIS W/CONTRAST: CPT | Performed by: EMERGENCY MEDICINE

## 2023-09-01 PROCEDURE — 85025 COMPLETE CBC W/AUTO DIFF WBC: CPT | Performed by: EMERGENCY MEDICINE

## 2023-09-01 PROCEDURE — 86901 BLOOD TYPING SEROLOGIC RH(D): CPT | Performed by: EMERGENCY MEDICINE

## 2023-09-01 PROCEDURE — 96361 HYDRATE IV INFUSION ADD-ON: CPT

## 2023-09-01 PROCEDURE — 81025 URINE PREGNANCY TEST: CPT

## 2023-09-01 PROCEDURE — 96374 THER/PROPH/DIAG INJ IV PUSH: CPT

## 2023-09-01 PROCEDURE — 86900 BLOOD TYPING SEROLOGIC ABO: CPT | Performed by: EMERGENCY MEDICINE

## 2023-09-01 PROCEDURE — 81001 URINALYSIS AUTO W/SCOPE: CPT | Performed by: EMERGENCY MEDICINE

## 2023-09-01 PROCEDURE — 80053 COMPREHEN METABOLIC PANEL: CPT | Performed by: EMERGENCY MEDICINE

## 2023-09-01 RX ORDER — MEDROXYPROGESTERONE ACETATE 5 MG/1
5 TABLET ORAL DAILY
Qty: 5 TABLET | Refills: 0 | Status: SHIPPED | OUTPATIENT
Start: 2023-09-01 | End: 2023-09-21

## 2023-09-01 RX ORDER — CEPHALEXIN 500 MG/1
500 CAPSULE ORAL ONCE
Status: COMPLETED | OUTPATIENT
Start: 2023-09-01 | End: 2023-09-01

## 2023-09-01 RX ORDER — CEPHALEXIN 500 MG/1
500 CAPSULE ORAL 2 TIMES DAILY
Qty: 20 CAPSULE | Refills: 0 | Status: SHIPPED | OUTPATIENT
Start: 2023-09-01 | End: 2023-09-21 | Stop reason: ALTCHOICE

## 2023-09-01 RX ORDER — MORPHINE SULFATE 4 MG/ML
4 INJECTION, SOLUTION INTRAMUSCULAR; INTRAVENOUS ONCE
Status: COMPLETED | OUTPATIENT
Start: 2023-09-01 | End: 2023-09-01

## 2023-09-01 RX ORDER — ONDANSETRON 2 MG/ML
4 INJECTION INTRAMUSCULAR; INTRAVENOUS ONCE
Status: COMPLETED | OUTPATIENT
Start: 2023-09-01 | End: 2023-09-01

## 2023-09-02 NOTE — ED INITIAL ASSESSMENT (HPI)
Pt presents to ed ambulatory with steady gait c/o llq abd pain and vaginal bleeding with pelvic pain at a 8/10 that started Saturday. States she is saturating a pad every 2-3 hours. Pt c/o recent constipation, denies urinary symptoms, c/o nausea, denies fever.

## 2023-09-02 NOTE — ED QUICK NOTES
Patient requesting pain medication. ED MD made aware and states an order for 4 mg IV Morphine and 4 mg IV Zofran will be placed.

## 2023-09-06 ENCOUNTER — OFFICE VISIT (OUTPATIENT)
Dept: HEMATOLOGY/ONCOLOGY | Facility: HOSPITAL | Age: 33
End: 2023-09-06
Attending: INTERNAL MEDICINE
Payer: MEDICAID

## 2023-09-06 VITALS
DIASTOLIC BLOOD PRESSURE: 80 MMHG | TEMPERATURE: 98 F | HEART RATE: 73 BPM | OXYGEN SATURATION: 100 % | HEIGHT: 67.01 IN | WEIGHT: 293 LBS | BODY MASS INDEX: 45.99 KG/M2 | SYSTOLIC BLOOD PRESSURE: 138 MMHG

## 2023-09-06 DIAGNOSIS — D50.0 IRON DEFICIENCY ANEMIA DUE TO CHRONIC BLOOD LOSS: Primary | ICD-10-CM

## 2023-09-06 PROCEDURE — 96374 THER/PROPH/DIAG INJ IV PUSH: CPT

## 2023-09-06 NOTE — PROGRESS NOTES
Pt here for iron infusion. Arrives Ambulating independently, accompanied by Other self           Modifications in dose or schedule: No     Frequency of blood return and site check throughout administration: Prior to administration   Discharged to Home, Ambulating independently, accompanied by: Other self    Outpatient Oncology Care Plan  Problem list:  anemia  Problems related to:    disease/disease progression  Interventions:  administered iron  Expected outcomes:  optimal lab values  Progress towards outcome:  making progress    Education Record    Learner:  Patient  Barriers / Limitations:  None  Method:  Brief focused  Outcome:  Shows understanding  Comments:observed pt 30 min post infusion. Pt tolerated infusion. No c/o. VSS. See flowsheet    Here for faraheme. Tolerated well. Pt needs to see MD soon so appt for third faraheme changed to day where she could also see him.

## 2023-09-13 ENCOUNTER — OFFICE VISIT (OUTPATIENT)
Dept: HEMATOLOGY/ONCOLOGY | Facility: HOSPITAL | Age: 33
End: 2023-09-13
Attending: INTERNAL MEDICINE
Payer: MEDICAID

## 2023-09-13 VITALS
WEIGHT: 293 LBS | HEIGHT: 67.01 IN | RESPIRATION RATE: 16 BRPM | TEMPERATURE: 98 F | OXYGEN SATURATION: 100 % | DIASTOLIC BLOOD PRESSURE: 85 MMHG | BODY MASS INDEX: 45.99 KG/M2 | SYSTOLIC BLOOD PRESSURE: 147 MMHG | HEART RATE: 88 BPM

## 2023-09-13 DIAGNOSIS — D50.0 IRON DEFICIENCY ANEMIA DUE TO CHRONIC BLOOD LOSS: Primary | ICD-10-CM

## 2023-09-13 PROCEDURE — 96374 THER/PROPH/DIAG INJ IV PUSH: CPT

## 2023-09-20 ENCOUNTER — APPOINTMENT (OUTPATIENT)
Dept: HEMATOLOGY/ONCOLOGY | Facility: HOSPITAL | Age: 33
End: 2023-09-20
Attending: INTERNAL MEDICINE
Payer: MEDICAID

## 2023-09-21 ENCOUNTER — OFFICE VISIT (OUTPATIENT)
Dept: HEMATOLOGY/ONCOLOGY | Facility: HOSPITAL | Age: 33
End: 2023-09-21
Attending: INTERNAL MEDICINE
Payer: MEDICAID

## 2023-09-21 VITALS
SYSTOLIC BLOOD PRESSURE: 132 MMHG | HEART RATE: 70 BPM | OXYGEN SATURATION: 99 % | TEMPERATURE: 97 F | BODY MASS INDEX: 49 KG/M2 | DIASTOLIC BLOOD PRESSURE: 83 MMHG | WEIGHT: 293 LBS

## 2023-09-21 VITALS — HEART RATE: 67 BPM | DIASTOLIC BLOOD PRESSURE: 81 MMHG | SYSTOLIC BLOOD PRESSURE: 134 MMHG

## 2023-09-21 DIAGNOSIS — D50.0 IRON DEFICIENCY ANEMIA DUE TO CHRONIC BLOOD LOSS: Primary | ICD-10-CM

## 2023-09-21 DIAGNOSIS — N92.0 MENORRHAGIA WITH REGULAR CYCLE: ICD-10-CM

## 2023-09-21 PROCEDURE — 96374 THER/PROPH/DIAG INJ IV PUSH: CPT

## 2023-09-21 PROCEDURE — 99215 OFFICE O/P EST HI 40 MIN: CPT | Performed by: INTERNAL MEDICINE

## 2023-09-21 NOTE — PROGRESS NOTES
Education Record    Learner:  Patient    Disease / Diagnosis: pt here for feraheme    Barriers / Limitations:  None   Comments:    Method:  Brief focused   Comments:    General Topics:  Fall risk and prevention   Comments:    Outcome:  Shows understanding   Comments:

## 2023-09-26 ENCOUNTER — OFFICE VISIT (OUTPATIENT)
Dept: INTERNAL MEDICINE CLINIC | Facility: CLINIC | Age: 33
End: 2023-09-26
Payer: MEDICAID

## 2023-09-26 VITALS
DIASTOLIC BLOOD PRESSURE: 84 MMHG | HEART RATE: 68 BPM | RESPIRATION RATE: 16 BRPM | SYSTOLIC BLOOD PRESSURE: 120 MMHG | HEIGHT: 67.5 IN | WEIGHT: 293 LBS | BODY MASS INDEX: 45.45 KG/M2

## 2023-09-26 DIAGNOSIS — E66.01 MORBID OBESITY (HCC): ICD-10-CM

## 2023-09-26 DIAGNOSIS — R06.83 SNORING: ICD-10-CM

## 2023-09-26 DIAGNOSIS — D50.0 IRON DEFICIENCY ANEMIA DUE TO CHRONIC BLOOD LOSS: ICD-10-CM

## 2023-09-26 DIAGNOSIS — Z51.81 THERAPEUTIC DRUG MONITORING: Primary | ICD-10-CM

## 2023-09-26 PROCEDURE — 3079F DIAST BP 80-89 MM HG: CPT | Performed by: NURSE PRACTITIONER

## 2023-09-26 PROCEDURE — 99213 OFFICE O/P EST LOW 20 MIN: CPT | Performed by: NURSE PRACTITIONER

## 2023-09-26 PROCEDURE — 3008F BODY MASS INDEX DOCD: CPT | Performed by: NURSE PRACTITIONER

## 2023-09-26 PROCEDURE — 3074F SYST BP LT 130 MM HG: CPT | Performed by: NURSE PRACTITIONER

## 2023-09-26 RX ORDER — PHENTERMINE HYDROCHLORIDE 15 MG/1
15 CAPSULE ORAL EVERY MORNING
Qty: 30 CAPSULE | Refills: 2 | Status: SHIPPED | OUTPATIENT
Start: 2023-10-04

## 2023-09-26 NOTE — PATIENT INSTRUCTIONS
Next steps:  1. Fill your prescribed medication and take as discussed and prescribed: phentermine 15mg  2. Schedule a personal nutrition consultation with one of our registered dieticians     Please try to work on the following dietary changes:  Daily protein recommendation to start:  grams  Daily carbohydrate: <170g  Daily calories: 2,100-2,200  1. Drink water with meals and throughout the day, cut down on soda and/or juice if consumed. Consider flavored water options like Bubbly, Spindrift, Hint and Cinthya. 2.  Eat breakfast daily and focus on having protein with each meal, examples include: greek yogurt, cottage cheese, hard boiled egg, whole grain toast with peanut butter. 3.  Reduce refined carbohydrates and sugars which includes items such as sweets, as well as rice, pasta, and bread and make sure to choose whole grain options when having them with just 1 serving per meal about the size of your inner palm. 4.  Consume non starchy veggies daily working towards making them a good 50% of your daily food intake. Add them to lunch and dinner consistently. 5.  Start a daily probiotic: VSL#3 is recommended, (order on line at www.vsl3. com). Take 1 capsule daily with water for 30 days, then reduce to 1 every other day (this will reduce the cost). Capsules can be left out for 2 weeks, but then must be refrigerated. Please download svetlana My Fitness Eric Perez! Or Net Diary to monitor daily dietary intake and you will be able to see if you are eating the right amount of calories or too much or too little which would hinder weight loss. Additionally this will help to see your daily carbohydrate and protein intake. When you set the svetlana up choose 1-2 lbs/week as a goal.  Keeping a paper food journal is an option as well to remain accountable for your choices- this is the start to mindful eating! A low calorie diet has been consistently shown to support weight loss.      Continue or start exercising to help establish a routine. If not already exercising begin with 1 day and progress as able with long-term goal of 30 minutes 5 days a week at a minimum. Meditation daily can help manage and control stress. Chronic stress can make weight loss difficult. Exercising is one way to help with stress, but meditation using the CALM Ravin or another comparable alternative can be done in your home or place of work with little time commitment. This Ravin can also help work on behavior change and improve sleep. Check out the segment under Calm Masterclass and listen to The 4 Pillars of Health. A great way to begin learning about the foundation of lifestyle with practical tips to use in your every day. Check out www.yourweightmatters. org blog for continued daily support and education along this weight loss journey! Patient Resources:     Personal Training/Fitness Classes/Health Coaching     Michelle Chowdhury and Parson Sophiaside @ [email or web address]/ Full fitness center with group fitness and personal training. Discount available as client of [facility]. Health Coaching and Personal Training with Wendie Mir at our [employer]- individual weekly coaching with option to add personal training and small group fitness classes targeted at weight loss- [phone] and/or email @ [email or web address]  360FIT [place] [email or web address] Group Fitness [phone] and/or email Heath Del Rosario at [email or web address] [email or web address]  [address] [facility] with multiple locations: Aetna (www.MileIQ. ChatterBlock), Eat The Frog Fitness (www.Startups. ChatterBlock), Fit Body Bootcamp (www.Restoration Roboticsbodybootcamp.com), Ganjiwang Fitness (www.Moxe Health. ChatterBlock), The Exercise  (www.exercisecoach.ChatterBlock)     Online Fitness  Fitness  on Whole Foods in 10 DVD series- www. nkatd57EUP. ChatterBlock  Sit and Be Fit - Chair exercise series Www.sitandbefit. org  Hip Hop Fit with Vamsi Art at Onset Technology     Apps for on the Veruta 7 Minute Workout (orange box with white 7) - free on the go HIIT training ravin  Peloton Ravin @ www. Location Labs     Nutrition Trackers and Tools  LoseIT! And My Fitness Pal apps and on line for tracking nutrition  NOOM - virtual health coaching  FitFoundation (healthy meals on the go) in [facility]-SCI @ www. btnywcjqhnnfh0y. Álvaro Jennings MD @ www.Ak?Lex and Elsy Menjivar (keto and low carb plans recommended) @ www. BBLVGL83.[facility], Metabolic Meals @ www. AdskomMetabolicMeals. com - individual prepared meals to go  [facility], yWorld, International Business Machines, Every Plate, Plato Networks- on line meal delivery programs for preparation at home  AK Endurance [employer] Power in [place] for homemade meals to go @ wwwEnterpriseDB  Diet Doctor @ www. dietdoctor. Tap2print - low carb swaps  YuClientShow - meal prep and planning ravin (www.yummly. com)     Stress Management/Behavior/Mindful Eating  CALM meditation ravin (www.PowWow Inc)  Headspace  Am I Hungry? Mindful eating virtual  ravin  Www.yourweightmatters. org - Obesity Action Coalition sponsored Blog posts daily  Motivation ravin (black box with white \")- daily supportive messages sent to your phone     Books/Video Education/Podcasts  Mindless Eating by Rosana Hdz  Why We Get Sick by Chriss Gonsalves (a book about insulin resistance)  Atomic Habits by Shelly Corrales (a book about taking small steps to promote greater behavior change)   Can't Hurt Me by Arnav Jones (a book exploring the power of discipline in achieving your goals)  The End of Dieting: How to Live for Life by Dr. Tiffanie Brownlee M.D. or listen to The [address] Episode 61: Understanding \"Nutritarian\" Eating w/Dr. Tiffanie Brownlee  Your Body in Balance: The World [employer] Services Corporation of Food, Hormones, and Health by Dr. Dusty Connolly  The Menopause Diet Plan by Zachary High and [facility] HOSP AT [facility]  The Complete Guide to fasting by Dr. Marni Montano, [facility] by aNdir Buchanan, Ph.D, R.D.   Weight Loss Surgery Will Not Treat Food Addiction by Zulma Saini Ph.D  The [address] [place] [address] on plant based nutrition  Fed Up - documentary about obesity (Free on [place])  The Truth About Sugar - documentary on sugar (Free on [place], https://youtu. be/4P3deuuUM4l)  The Dr. Renetta Rodrigues by Dr. Tosha Montaño MD  Fitlosophy Fitspiration - journal to better health (found at [employer] in fitness aisle)  What Happened to You?- a look at the impact trauma has on behavior written by Ave [place] and Dr. Reid Arreola Again by [place] - discovering your true self after trauma  Kasey Fair talk on Wiziva, The Call to Courage  Podcasts: The Exam Room by the Physician's Committee, Nutrition Facts by Dr. Alicia Herrera    We are here to support you with weight loss, but please remember that you still need your primary care provider for your routine health maintenance.

## 2023-09-27 ENCOUNTER — OFFICE VISIT (OUTPATIENT)
Dept: FAMILY MEDICINE CLINIC | Facility: CLINIC | Age: 33
End: 2023-09-27
Payer: MEDICAID

## 2023-09-27 ENCOUNTER — OFFICE VISIT (OUTPATIENT)
Dept: HEMATOLOGY/ONCOLOGY | Facility: HOSPITAL | Age: 33
End: 2023-09-27
Attending: INTERNAL MEDICINE
Payer: MEDICAID

## 2023-09-27 VITALS
SYSTOLIC BLOOD PRESSURE: 124 MMHG | WEIGHT: 293 LBS | HEART RATE: 66 BPM | TEMPERATURE: 98 F | BODY MASS INDEX: 48 KG/M2 | RESPIRATION RATE: 18 BRPM | DIASTOLIC BLOOD PRESSURE: 82 MMHG | OXYGEN SATURATION: 99 %

## 2023-09-27 VITALS
SYSTOLIC BLOOD PRESSURE: 133 MMHG | TEMPERATURE: 98 F | OXYGEN SATURATION: 99 % | DIASTOLIC BLOOD PRESSURE: 81 MMHG | RESPIRATION RATE: 18 BRPM | HEART RATE: 65 BPM

## 2023-09-27 DIAGNOSIS — D50.9 IRON DEFICIENCY ANEMIA, UNSPECIFIED IRON DEFICIENCY ANEMIA TYPE: ICD-10-CM

## 2023-09-27 DIAGNOSIS — R35.0 FREQUENT URINATION: ICD-10-CM

## 2023-09-27 DIAGNOSIS — N92.0 MENORRHAGIA WITH REGULAR CYCLE: Primary | ICD-10-CM

## 2023-09-27 DIAGNOSIS — N30.00 ACUTE CYSTITIS WITHOUT HEMATURIA: ICD-10-CM

## 2023-09-27 DIAGNOSIS — D25.9 UTERINE LEIOMYOMA, UNSPECIFIED LOCATION: ICD-10-CM

## 2023-09-27 DIAGNOSIS — D50.0 IRON DEFICIENCY ANEMIA DUE TO CHRONIC BLOOD LOSS: Primary | ICD-10-CM

## 2023-09-27 PROBLEM — R42 DIZZINESS: Status: RESOLVED | Noted: 2023-09-27 | Resolved: 2023-09-27

## 2023-09-27 PROBLEM — L70.9 ACNE: Status: ACTIVE | Noted: 2023-09-27

## 2023-09-27 PROBLEM — R42 DIZZINESS: Status: ACTIVE | Noted: 2023-09-27

## 2023-09-27 PROBLEM — L91.0 HYPERTROPHIC SCAR: Status: ACTIVE | Noted: 2023-09-27

## 2023-09-27 PROBLEM — N93.9 ABNORMAL UTERINE BLEEDING (AUB): Status: RESOLVED | Noted: 2023-02-13 | Resolved: 2023-09-27

## 2023-09-27 PROBLEM — L21.9 SEBORRHEIC DERMATITIS: Status: ACTIVE | Noted: 2023-09-27

## 2023-09-27 LAB
APPEARANCE: CLEAR
BILIRUBIN: NEGATIVE
GLUCOSE (URINE DIPSTICK): NEGATIVE MG/DL
KETONES (URINE DIPSTICK): NEGATIVE MG/DL
MULTISTIX LOT#: ABNORMAL NUMERIC
NITRITE, URINE: NEGATIVE
PH, URINE: 7 (ref 4.5–8)
PROTEIN (URINE DIPSTICK): NEGATIVE MG/DL
SPECIFIC GRAVITY: 1.02 (ref 1–1.03)
URINE-COLOR: YELLOW
UROBILINOGEN,SEMI-QN: 0.2 MG/DL (ref 0–1.9)

## 2023-09-27 PROCEDURE — 99214 OFFICE O/P EST MOD 30 MIN: CPT | Performed by: FAMILY MEDICINE

## 2023-09-27 PROCEDURE — 87086 URINE CULTURE/COLONY COUNT: CPT | Performed by: FAMILY MEDICINE

## 2023-09-27 PROCEDURE — 3079F DIAST BP 80-89 MM HG: CPT | Performed by: FAMILY MEDICINE

## 2023-09-27 PROCEDURE — 96374 THER/PROPH/DIAG INJ IV PUSH: CPT

## 2023-09-27 PROCEDURE — 81003 URINALYSIS AUTO W/O SCOPE: CPT | Performed by: FAMILY MEDICINE

## 2023-09-27 PROCEDURE — 3074F SYST BP LT 130 MM HG: CPT | Performed by: FAMILY MEDICINE

## 2023-10-01 ENCOUNTER — PATIENT MESSAGE (OUTPATIENT)
Dept: FAMILY MEDICINE CLINIC | Facility: CLINIC | Age: 33
End: 2023-10-01

## 2023-10-06 ENCOUNTER — TELEPHONE (OUTPATIENT)
Dept: FAMILY MEDICINE CLINIC | Facility: CLINIC | Age: 33
End: 2023-10-06

## 2023-10-06 DIAGNOSIS — N76.0 BACTERIAL VAGINOSIS: Primary | ICD-10-CM

## 2023-10-06 DIAGNOSIS — B96.89 BACTERIAL VAGINOSIS: Primary | ICD-10-CM

## 2023-10-06 NOTE — TELEPHONE ENCOUNTER
I cannot say if it is a yeast infection vs bacterial vaginosis infection. Typical yeast has white, cheesy-looking discharge. BV has discharge with a fishy odor usually. However, these can coexist.  Is she particularly prone to one type of infection over another? Since she was just in for a visit I'm willing to try the yeast infection pill for her. But if this does not resolve her symptoms, then she needs an appt for a vaginal culture to test the discharge and to identify precisely what infection she has. Thanks.

## 2023-10-06 NOTE — TELEPHONE ENCOUNTER
Pt called, states her urine culture came back clear, but she is still having foul smelling vaginal discharge, lower abd pain, frequent and burning with urination. Wants to know if it's a yeast infection, and if so, can PCP prescribe antifungal without her coming in. LOV was 9/27/2023    Routed to provider for review and advise. eMERGENCY dEPARTMENT eNCOUnter      CHIEF COMPLAINT    Chief Complaint   Patient presents with   • Head Injury Without LOC       HPI  Laura Orantes is a 3 month old female with no significant past medical history of presents to the ER brought in by mother with fall with swelling over the right frontoparietal region.  Incident happened about 2 hours prior to arrival to the ER.  Mother states that she was carrying her child and walking when her legs went through 1 of the broken vents at home.  Mother states that she fell along with the child.  Child bumped his head on the floor.  Child cried immediately after the fall but then appeared well.  Mother noticed swelling over the scalp and decided to come to the emergency room.  No history of any fussiness, vomiting.  No other injuries as per mother.    ALLERGIES    ALLERGIES:  No Known Allergies    CURRENT MEDICATIONS    No current facility-administered medications for this encounter.      No current outpatient medications on file.       PAST MEDICAL HISTORY    Past Medical History:   Diagnosis Date   • Spitting up  2020    resolved 2020       SURGICAL HISTORY    History reviewed. No pertinent surgical history.    SOCIAL HISTORY    Child is meeting all age-appropriate mile stones.    FAMILY HISTORY    Family History   Problem Relation Age of Onset   • Other Half-Brother         jaundice, premature infant       REVIEW OF SYSTEMS:    As per HPI.  Constitutional:  No fevers.   HENT:  No earache, nasal congestion or sore throat.   Respiratory:  No cough, wheezing, or breathing difficulty.   GI:  No abdominal pain, vomiting, or diarrhea.   Skin:  No rash.     PHYSICAL EXAM  ED Triage Vitals [20]   BP    Heart Rate 115   Resp    Temp 98.5 °F (36.9 °C)   SpO2 99 %     Pulse Ox Interpretation:  Within normal limits  Constitutional:  Well-developed, well-nourished, non-toxic appearing child who is playful,  smiling and interactive.   Eyes:  PERRL,  conjunctivae normal.  HENT:  Moderate-sized hematoma noted over the right frontoparietal region.  Hematoma is soft.  External ears normal. Tympanic membranes.   normal. Nose normal. Oropharynx moist.   Neck:  Normal range of motion, no tenderness, supple.  Respiratory:  No respiratory distress, normal breath sounds, no rales, no wheezing.   Cardiovascular:  Normal rate, normal rhythm, no murmurs.   GI:  Soft, nondistended, normal bowel sounds, nontender, no organomegaly, no mass, no guarding.   Musculoskeletal:  No edema, no tenderness, no deformities. Back - no tenderness.  Integument:  No rash noted  Lymphatic no lymphadenopathy.  Neurologic:  Alert, strength and tone normal.      9:50 p.m.:  Discussed with Dr. Wenceslao Bauer, ED physician at [facility] in [place].  Discussed regarding patient presentation, clinical findings and CT scan findings of undisplaced fracture of the skull.  Recommendations:  No acute interventions at this time as child is neurologically intact, hemodynamically stable, not fussy and has no vomiting.  Child to be discharged home with close follow-up with primary care tomorrow.  To return to ER in case of any vomiting, fussiness or any other new symptoms.    10:03 p.m.  Discussed with .  Agrees with plan to follow-up patient tomorrow.  Will convey message to patient's primary care Edith Gibson.        Radiology, Images   CT HEAD WO CONTRAST   Final Result   IMPRESSION:     1. Right frontal-parietal small cephalohematoma with underlying   nondisplaced skull fracture.   2. No intracranial hemorrhage.            I have reviewed radiology images and impressions        ED Course/ MDM:  Laura Orantes is a 3 month old female with no significant past medical history of presents to the ER brought in by mother with fall with swelling over the right frontoparietal region.  Incident happened about 2 hours prior to arrival to the ER.  Mother states that she was carrying her child  and walking when her legs went through 1 of the broken vents at home.  Mother states that she fell along with the child.  Child bumped his head on the floor.  Child cried immediately after the fall but then appeared well.  Mother noticed swelling over the scalp and decided to come to the emergency room.   Child on exam in the ER appears well with no acute distress.  Child is active, alert with no fussiness or crying.  Child also feeding well.  No vomiting noted.  Neurological exam within normal limits.  No signs of abuse noted.  CT scan of the head revealed undisplaced fracture of the right frontoparietal region with no intracranial abnormalities.  Discussed with Dr. Bauer from [facility] emergency room.  Recommendations as mentioned above.  Discussed with the pediatrician Dr. Griggs, who agrees to follow-up the patient tomorrow.  Message sent to patient's primary care for follow-up.  Mother was reassured and was advised to give Tylenol as needed for pain for the next couple days.  Advised return to ER in case of any altered status including vomiting, fussiness.  Mother verbalized understanding plan.  Patient discharged home in stable condition.      Diagnosis  The primary encounter diagnosis was Closed fracture of parietal bone, initial encounter ([facility]). A diagnosis of Fall in home, initial encounter was also pertinent to this visit.    Follow Up:  Viktoriya Sharma MD  [address] [place] [id number] [phone]    In 1 day       Patient advised to follow up In ER in case of worsening or new symptoms. Patient agrees with plan.    ED Medications   ED Medication Orders (From admission, onward)    None             Brad WOOD MD  12/08/20 2210       Brad WOOD MD  12/08/20 [id number]

## 2023-10-09 RX ORDER — METRONIDAZOLE 7.5 MG/G
1 GEL VAGINAL NIGHTLY
Qty: 70 G | Refills: 0 | Status: SHIPPED | OUTPATIENT
Start: 2023-10-09 | End: 2023-10-14

## 2023-10-09 NOTE — TELEPHONE ENCOUNTER
Pt informed of Dr. [place] advice. Pt states she is having fishy odor and she is more prone to BV. Pt's [facility] is [facility] in [place]. Routed to provider for review and Rx.

## 2023-10-23 NOTE — TELEPHONE ENCOUNTER
ADA Medical Assessment Form filled out and signed by provider. Forms faxed at [phone]. Confirmation received.

## 2023-10-25 ENCOUNTER — APPOINTMENT (OUTPATIENT)
Dept: HEMATOLOGY/ONCOLOGY | Facility: HOSPITAL | Age: 33
End: 2023-10-25
Attending: INTERNAL MEDICINE
Payer: MEDICAID

## 2023-11-01 ENCOUNTER — NURSE ONLY (OUTPATIENT)
Dept: HEMATOLOGY/ONCOLOGY | Facility: HOSPITAL | Age: 33
End: 2023-11-01
Attending: INTERNAL MEDICINE
Payer: MEDICAID

## 2023-11-01 ENCOUNTER — LAB ENCOUNTER (OUTPATIENT)
Dept: LAB | Facility: HOSPITAL | Age: 33
End: 2023-11-01
Attending: FAMILY MEDICINE
Payer: MEDICAID

## 2023-11-01 ENCOUNTER — OFFICE VISIT (OUTPATIENT)
Dept: FAMILY MEDICINE CLINIC | Facility: CLINIC | Age: 33
End: 2023-11-01
Payer: MEDICAID

## 2023-11-01 VITALS
TEMPERATURE: 98 F | BODY MASS INDEX: 48 KG/M2 | SYSTOLIC BLOOD PRESSURE: 122 MMHG | DIASTOLIC BLOOD PRESSURE: 80 MMHG | RESPIRATION RATE: 18 BRPM | HEART RATE: 63 BPM | WEIGHT: 293 LBS | OXYGEN SATURATION: 99 %

## 2023-11-01 DIAGNOSIS — D25.9 UTERINE LEIOMYOMA, UNSPECIFIED LOCATION: ICD-10-CM

## 2023-11-01 DIAGNOSIS — D50.0 IRON DEFICIENCY ANEMIA DUE TO CHRONIC BLOOD LOSS: ICD-10-CM

## 2023-11-01 DIAGNOSIS — Z01.818 PREOP EXAMINATION: ICD-10-CM

## 2023-11-01 DIAGNOSIS — Z01.818 PREOP EXAMINATION: Primary | ICD-10-CM

## 2023-11-01 DIAGNOSIS — N93.9 ABNORMAL UTERINE BLEEDING (AUB): ICD-10-CM

## 2023-11-01 DIAGNOSIS — N92.0 MENORRHAGIA WITH REGULAR CYCLE: ICD-10-CM

## 2023-11-01 LAB
ANTIBODY SCREEN: NEGATIVE
BASOPHILS # BLD AUTO: 0.01 X10(3) UL (ref 0–0.2)
BASOPHILS NFR BLD AUTO: 0.2 %
DEPRECATED HBV CORE AB SER IA-ACNC: 43.4 NG/ML
EOSINOPHIL # BLD AUTO: 0.07 X10(3) UL (ref 0–0.7)
EOSINOPHIL NFR BLD AUTO: 1.2 %
ERYTHROCYTE [DISTWIDTH] IN BLOOD BY AUTOMATED COUNT: 15.9 %
HCT VFR BLD AUTO: 36 %
HGB BLD-MCNC: 11.8 G/DL
IMM GRANULOCYTES # BLD AUTO: 0.02 X10(3) UL (ref 0–1)
IMM GRANULOCYTES NFR BLD: 0.3 %
IRON SATN MFR SERPL: 10 %
IRON SERPL-MCNC: 32 UG/DL
LYMPHOCYTES # BLD AUTO: 1.37 X10(3) UL (ref 1–4)
LYMPHOCYTES NFR BLD AUTO: 23.3 %
MCH RBC QN AUTO: 27.3 PG (ref 26–34)
MCHC RBC AUTO-ENTMCNC: 32.8 G/DL (ref 31–37)
MCV RBC AUTO: 83.1 FL
MONOCYTES # BLD AUTO: 0.4 X10(3) UL (ref 0.1–1)
MONOCYTES NFR BLD AUTO: 6.8 %
NEUTROPHILS # BLD AUTO: 4.01 X10 (3) UL (ref 1.5–7.7)
NEUTROPHILS # BLD AUTO: 4.01 X10(3) UL (ref 1.5–7.7)
NEUTROPHILS NFR BLD AUTO: 68.2 %
PLATELET # BLD AUTO: 268 10(3)UL (ref 150–450)
RBC # BLD AUTO: 4.33 X10(6)UL
RH BLOOD TYPE: POSITIVE
TIBC SERPL-MCNC: 325 UG/DL (ref 240–450)
TRANSFERRIN SERPL-MCNC: 218 MG/DL (ref 200–360)
WBC # BLD AUTO: 5.9 X10(3) UL (ref 4–11)

## 2023-11-01 PROCEDURE — 3079F DIAST BP 80-89 MM HG: CPT | Performed by: FAMILY MEDICINE

## 2023-11-01 PROCEDURE — 83540 ASSAY OF IRON: CPT

## 2023-11-01 PROCEDURE — 86901 BLOOD TYPING SEROLOGIC RH(D): CPT

## 2023-11-01 PROCEDURE — 36415 COLL VENOUS BLD VENIPUNCTURE: CPT

## 2023-11-01 PROCEDURE — 82728 ASSAY OF FERRITIN: CPT

## 2023-11-01 PROCEDURE — 86900 BLOOD TYPING SEROLOGIC ABO: CPT

## 2023-11-01 PROCEDURE — 99214 OFFICE O/P EST MOD 30 MIN: CPT | Performed by: FAMILY MEDICINE

## 2023-11-01 PROCEDURE — 93000 ELECTROCARDIOGRAM COMPLETE: CPT | Performed by: FAMILY MEDICINE

## 2023-11-01 PROCEDURE — 86850 RBC ANTIBODY SCREEN: CPT

## 2023-11-01 PROCEDURE — 85025 COMPLETE CBC W/AUTO DIFF WBC: CPT

## 2023-11-01 PROCEDURE — 83550 IRON BINDING TEST: CPT

## 2023-11-01 PROCEDURE — 3074F SYST BP LT 130 MM HG: CPT | Performed by: FAMILY MEDICINE

## 2023-11-01 RX ORDER — CEFAZOLIN SODIUM IN 0.9 % NACL 3 G/100 ML
3 INTRAVENOUS SOLUTION, PIGGYBACK (ML) INTRAVENOUS ONCE
Status: CANCELLED | OUTPATIENT
Start: 2023-11-01 | End: 2023-11-01

## 2023-11-02 LAB
ATRIAL RATE: 58 BPM
HEMOGLOBIN A1C: 4.6 % OF TOTAL HGB
P AXIS: 43 DEGREES
P-R INTERVAL: 196 MS
Q-T INTERVAL: 414 MS
QRS DURATION: 86 MS
QTC CALCULATION (BEZET): 406 MS
R AXIS: -3 DEGREES
T AXIS: 45 DEGREES
VENTRICULAR RATE: 58 BPM

## 2023-11-03 LAB
ALBUMIN/GLOBULIN RATIO: 1.7 (CALC) (ref 1–2.5)
ALBUMIN: 4.2 G/DL (ref 3.6–5.1)
ALKALINE PHOSPHATASE: 46 U/L (ref 31–125)
ALT: 20 U/L (ref 6–29)
AST: 14 U/L (ref 10–30)
BILIRUBIN, TOTAL: 0.3 MG/DL (ref 0.2–1.2)
BILIRUBIN: NEGATIVE
BUN: 8 MG/DL (ref 7–25)
CALCIUM: 9.2 MG/DL (ref 8.6–10.2)
CARBON DIOXIDE: 27 MMOL/L (ref 20–32)
CHLORIDE: 105 MMOL/L (ref 98–110)
COLOR: YELLOW
CREATININE: 0.7 MG/DL (ref 0.5–0.97)
EGFR: 118 ML/MIN/1.73M2
GLOBULIN: 2.5 G/DL (CALC) (ref 1.9–3.7)
GLUCOSE: 83 MG/DL (ref 65–99)
GLUCOSE: NEGATIVE
INR: 1
KETONES: NEGATIVE
LEUKOCYTE ESTERASE: NEGATIVE
NITRITE: NEGATIVE
PARTIAL THROMBOPLASTIN$TIME, ACTIVATED: 32 SEC (ref 23–32)
PH: 8 (ref 5–8)
POTASSIUM: 4 MMOL/L (ref 3.5–5.3)
PROTEIN, TOTAL: 6.7 G/DL (ref 6.1–8.1)
PT: 10.5 SEC (ref 9–11.5)
SODIUM: 139 MMOL/L (ref 135–146)
SPECIFIC GRAVITY: 1.01 (ref 1–1.03)

## 2023-11-08 ENCOUNTER — ULTRASOUND ENCOUNTER (OUTPATIENT)
Dept: OBGYN CLINIC | Facility: CLINIC | Age: 33
End: 2023-11-08
Payer: MEDICAID

## 2023-11-08 ENCOUNTER — OFFICE VISIT (OUTPATIENT)
Dept: HEMATOLOGY/ONCOLOGY | Facility: HOSPITAL | Age: 33
End: 2023-11-08
Attending: INTERNAL MEDICINE
Payer: MEDICAID

## 2023-11-08 VITALS
RESPIRATION RATE: 16 BRPM | TEMPERATURE: 98 F | OXYGEN SATURATION: 100 % | DIASTOLIC BLOOD PRESSURE: 86 MMHG | HEART RATE: 65 BPM | SYSTOLIC BLOOD PRESSURE: 123 MMHG | WEIGHT: 293 LBS | BODY MASS INDEX: 45.99 KG/M2 | HEIGHT: 67.01 IN

## 2023-11-08 DIAGNOSIS — D25.9 UTERINE LEIOMYOMA, UNSPECIFIED LOCATION: Primary | ICD-10-CM

## 2023-11-08 DIAGNOSIS — D50.0 IRON DEFICIENCY ANEMIA DUE TO CHRONIC BLOOD LOSS: Primary | ICD-10-CM

## 2023-11-08 PROCEDURE — 76856 US EXAM PELVIC COMPLETE: CPT | Performed by: OBSTETRICS & GYNECOLOGY

## 2023-11-08 PROCEDURE — 96374 THER/PROPH/DIAG INJ IV PUSH: CPT

## 2023-11-08 PROCEDURE — 76830 TRANSVAGINAL US NON-OB: CPT | Performed by: OBSTETRICS & GYNECOLOGY

## 2023-11-08 NOTE — PROGRESS NOTES
Education Record    Learner:  Patient    Disease / Diagnosis: anemia     Barriers / Limitations:  None   Comments:    Method:  Discussion   Comments:    General Topics:  Medication, Side effects and symptom management, and Plan of care reviewed   Comments:    Outcome:  Shows understanding   Comments:    Patient here for feraheme infusion.  Patient tolerated infusion and was discharged with incident

## 2023-11-15 ENCOUNTER — OFFICE VISIT (OUTPATIENT)
Dept: HEMATOLOGY/ONCOLOGY | Facility: HOSPITAL | Age: 33
End: 2023-11-15
Attending: INTERNAL MEDICINE
Payer: MEDICAID

## 2023-11-15 VITALS
SYSTOLIC BLOOD PRESSURE: 137 MMHG | HEART RATE: 70 BPM | OXYGEN SATURATION: 96 % | DIASTOLIC BLOOD PRESSURE: 82 MMHG | RESPIRATION RATE: 16 BRPM | TEMPERATURE: 98 F

## 2023-11-15 DIAGNOSIS — D50.0 IRON DEFICIENCY ANEMIA DUE TO CHRONIC BLOOD LOSS: Primary | ICD-10-CM

## 2023-11-15 PROCEDURE — 96374 THER/PROPH/DIAG INJ IV PUSH: CPT

## 2023-11-21 ENCOUNTER — ANESTHESIA EVENT (OUTPATIENT)
Dept: SURGERY | Facility: HOSPITAL | Age: 33
End: 2023-11-21
Payer: MEDICAID

## 2023-11-27 ENCOUNTER — HOSPITAL ENCOUNTER (INPATIENT)
Facility: HOSPITAL | Age: 33
LOS: 2 days | Discharge: HOME | DRG: 742 | End: 2023-11-29
Attending: OBSTETRICS & GYNECOLOGY | Admitting: OBSTETRICS & GYNECOLOGY
Payer: MEDICAID

## 2023-11-27 ENCOUNTER — ANESTHESIA (OUTPATIENT)
Dept: SURGERY | Facility: HOSPITAL | Age: 33
End: 2023-11-27
Payer: MEDICAID

## 2023-11-27 ENCOUNTER — TELEPHONE (OUTPATIENT)
Dept: OBGYN CLINIC | Facility: CLINIC | Age: 33
End: 2023-11-27

## 2023-11-27 DIAGNOSIS — N92.0 MENORRHAGIA WITH REGULAR CYCLE: ICD-10-CM

## 2023-11-27 DIAGNOSIS — D25.9 UTERINE LEIOMYOMA, UNSPECIFIED LOCATION: ICD-10-CM

## 2023-11-27 DIAGNOSIS — D64.9 ANEMIA, UNSPECIFIED TYPE: ICD-10-CM

## 2023-11-27 DIAGNOSIS — N93.9 ABNORMAL UTERINE BLEEDING: ICD-10-CM

## 2023-11-27 LAB — B-HCG UR QL: NEGATIVE

## 2023-11-27 PROCEDURE — 58140 MYOMECTOMY ABDOM METHOD: CPT | Performed by: OBSTETRICS & GYNECOLOGY

## 2023-11-27 PROCEDURE — 58140 MYOMECTOMY ABDOM METHOD: CPT | Performed by: STUDENT IN AN ORGANIZED HEALTH CARE EDUCATION/TRAINING PROGRAM

## 2023-11-27 PROCEDURE — 3E0T3BZ INTRODUCTION OF ANESTHETIC AGENT INTO PERIPHERAL NERVES AND PLEXI, PERCUTANEOUS APPROACH: ICD-10-PCS | Performed by: OBSTETRICS & GYNECOLOGY

## 2023-11-27 PROCEDURE — 76942 ECHO GUIDE FOR BIOPSY: CPT | Performed by: STUDENT IN AN ORGANIZED HEALTH CARE EDUCATION/TRAINING PROGRAM

## 2023-11-27 PROCEDURE — 0UB90ZZ EXCISION OF UTERUS, OPEN APPROACH: ICD-10-PCS | Performed by: OBSTETRICS & GYNECOLOGY

## 2023-11-27 RX ORDER — DOCUSATE SODIUM 100 MG/1
100 CAPSULE, LIQUID FILLED ORAL 2 TIMES DAILY
Status: DISCONTINUED | OUTPATIENT
Start: 2023-11-27 | End: 2023-11-29

## 2023-11-27 RX ORDER — SODIUM CHLORIDE, SODIUM LACTATE, POTASSIUM CHLORIDE, CALCIUM CHLORIDE 600; 310; 30; 20 MG/100ML; MG/100ML; MG/100ML; MG/100ML
INJECTION, SOLUTION INTRAVENOUS CONTINUOUS
Status: DISCONTINUED | OUTPATIENT
Start: 2023-11-27 | End: 2023-11-27

## 2023-11-27 RX ORDER — HYDROCODONE BITARTRATE AND ACETAMINOPHEN 5; 325 MG/1; MG/1
2 TABLET ORAL ONCE AS NEEDED
Status: DISCONTINUED | OUTPATIENT
Start: 2023-11-27 | End: 2023-11-27 | Stop reason: HOSPADM

## 2023-11-27 RX ORDER — SODIUM CHLORIDE, SODIUM LACTATE, POTASSIUM CHLORIDE, CALCIUM CHLORIDE 600; 310; 30; 20 MG/100ML; MG/100ML; MG/100ML; MG/100ML
INJECTION, SOLUTION INTRAVENOUS CONTINUOUS
Status: DISCONTINUED | OUTPATIENT
Start: 2023-11-27 | End: 2023-11-29

## 2023-11-27 RX ORDER — POLYETHYLENE GLYCOL 3350 17 G/17G
17 POWDER, FOR SOLUTION ORAL DAILY PRN
Status: DISCONTINUED | OUTPATIENT
Start: 2023-11-27 | End: 2023-11-29

## 2023-11-27 RX ORDER — ACETAMINOPHEN 500 MG
1000 TABLET ORAL ONCE AS NEEDED
Status: DISCONTINUED | OUTPATIENT
Start: 2023-11-27 | End: 2023-11-27 | Stop reason: HOSPADM

## 2023-11-27 RX ORDER — HYDROMORPHONE HYDROCHLORIDE 1 MG/ML
0.2 INJECTION, SOLUTION INTRAMUSCULAR; INTRAVENOUS; SUBCUTANEOUS EVERY 5 MIN PRN
Status: DISCONTINUED | OUTPATIENT
Start: 2023-11-27 | End: 2023-11-27 | Stop reason: HOSPADM

## 2023-11-27 RX ORDER — HYDROMORPHONE HYDROCHLORIDE 1 MG/ML
0.4 INJECTION, SOLUTION INTRAMUSCULAR; INTRAVENOUS; SUBCUTANEOUS EVERY 5 MIN PRN
Status: DISCONTINUED | OUTPATIENT
Start: 2023-11-27 | End: 2023-11-27 | Stop reason: HOSPADM

## 2023-11-27 RX ORDER — LIDOCAINE HYDROCHLORIDE 10 MG/ML
INJECTION, SOLUTION EPIDURAL; INFILTRATION; INTRACAUDAL; PERINEURAL AS NEEDED
Status: DISCONTINUED | OUTPATIENT
Start: 2023-11-27 | End: 2023-11-27 | Stop reason: SURG

## 2023-11-27 RX ORDER — BISACODYL 10 MG
10 SUPPOSITORY, RECTAL RECTAL
Status: DISCONTINUED | OUTPATIENT
Start: 2023-11-27 | End: 2023-11-29

## 2023-11-27 RX ORDER — HYDROMORPHONE HYDROCHLORIDE 1 MG/ML
0.6 INJECTION, SOLUTION INTRAMUSCULAR; INTRAVENOUS; SUBCUTANEOUS EVERY 5 MIN PRN
Status: DISCONTINUED | OUTPATIENT
Start: 2023-11-27 | End: 2023-11-27 | Stop reason: HOSPADM

## 2023-11-27 RX ORDER — ONDANSETRON 2 MG/ML
4 INJECTION INTRAMUSCULAR; INTRAVENOUS EVERY 8 HOURS PRN
Status: DISCONTINUED | OUTPATIENT
Start: 2023-11-27 | End: 2023-11-29

## 2023-11-27 RX ORDER — ENEMA 19; 7 G/133ML; G/133ML
1 ENEMA RECTAL ONCE AS NEEDED
Status: DISCONTINUED | OUTPATIENT
Start: 2023-11-27 | End: 2023-11-29

## 2023-11-27 RX ORDER — GLYCOPYRROLATE 0.2 MG/ML
INJECTION, SOLUTION INTRAMUSCULAR; INTRAVENOUS AS NEEDED
Status: DISCONTINUED | OUTPATIENT
Start: 2023-11-27 | End: 2023-11-27 | Stop reason: SURG

## 2023-11-27 RX ORDER — KETOROLAC TROMETHAMINE 30 MG/ML
INJECTION, SOLUTION INTRAMUSCULAR; INTRAVENOUS AS NEEDED
Status: DISCONTINUED | OUTPATIENT
Start: 2023-11-27 | End: 2023-11-27 | Stop reason: SURG

## 2023-11-27 RX ORDER — DIPHENHYDRAMINE HYDROCHLORIDE 50 MG/ML
12.5 INJECTION INTRAMUSCULAR; INTRAVENOUS EVERY 4 HOURS PRN
Status: DISCONTINUED | OUTPATIENT
Start: 2023-11-27 | End: 2023-11-29

## 2023-11-27 RX ORDER — SCOLOPAMINE TRANSDERMAL SYSTEM 1 MG/1
1 PATCH, EXTENDED RELEASE TRANSDERMAL
Status: DISCONTINUED | OUTPATIENT
Start: 2023-11-27 | End: 2023-11-27 | Stop reason: HOSPADM

## 2023-11-27 RX ORDER — OXYCODONE HYDROCHLORIDE 10 MG/1
10 TABLET ORAL EVERY 4 HOURS PRN
Status: DISCONTINUED | OUTPATIENT
Start: 2023-11-27 | End: 2023-11-29

## 2023-11-27 RX ORDER — ROCURONIUM BROMIDE 10 MG/ML
INJECTION, SOLUTION INTRAVENOUS AS NEEDED
Status: DISCONTINUED | OUTPATIENT
Start: 2023-11-27 | End: 2023-11-27 | Stop reason: SURG

## 2023-11-27 RX ORDER — KETOROLAC TROMETHAMINE 15 MG/ML
30 INJECTION, SOLUTION INTRAMUSCULAR; INTRAVENOUS EVERY 6 HOURS
Status: COMPLETED | OUTPATIENT
Start: 2023-11-27 | End: 2023-11-28

## 2023-11-27 RX ORDER — SIMETHICONE 80 MG
80 TABLET,CHEWABLE ORAL
Status: DISCONTINUED | OUTPATIENT
Start: 2023-11-27 | End: 2023-11-29

## 2023-11-27 RX ORDER — IBUPROFEN 600 MG/1
600 TABLET ORAL EVERY 6 HOURS SCHEDULED
Status: DISCONTINUED | OUTPATIENT
Start: 2023-11-28 | End: 2023-11-29

## 2023-11-27 RX ORDER — SCOLOPAMINE TRANSDERMAL SYSTEM 1 MG/1
PATCH, EXTENDED RELEASE TRANSDERMAL
Status: COMPLETED
Start: 2023-11-27 | End: 2023-11-27

## 2023-11-27 RX ORDER — FAMOTIDINE 20 MG/1
20 TABLET, FILM COATED ORAL 2 TIMES DAILY
Status: DISCONTINUED | OUTPATIENT
Start: 2023-11-27 | End: 2023-11-29

## 2023-11-27 RX ORDER — SODIUM CHLORIDE, SODIUM LACTATE, POTASSIUM CHLORIDE, CALCIUM CHLORIDE 600; 310; 30; 20 MG/100ML; MG/100ML; MG/100ML; MG/100ML
INJECTION, SOLUTION INTRAVENOUS CONTINUOUS
Status: DISCONTINUED | OUTPATIENT
Start: 2023-11-27 | End: 2023-11-27 | Stop reason: HOSPADM

## 2023-11-27 RX ORDER — CEFAZOLIN SODIUM IN 0.9 % NACL 3 G/100 ML
3 INTRAVENOUS SOLUTION, PIGGYBACK (ML) INTRAVENOUS ONCE
Status: COMPLETED | OUTPATIENT
Start: 2023-11-27 | End: 2023-11-27

## 2023-11-27 RX ORDER — HYDROCODONE BITARTRATE AND ACETAMINOPHEN 5; 325 MG/1; MG/1
1 TABLET ORAL ONCE AS NEEDED
Status: DISCONTINUED | OUTPATIENT
Start: 2023-11-27 | End: 2023-11-27 | Stop reason: HOSPADM

## 2023-11-27 RX ORDER — ONDANSETRON 2 MG/ML
INJECTION INTRAMUSCULAR; INTRAVENOUS AS NEEDED
Status: DISCONTINUED | OUTPATIENT
Start: 2023-11-27 | End: 2023-11-27 | Stop reason: SURG

## 2023-11-27 RX ORDER — HYDROMORPHONE HYDROCHLORIDE 1 MG/ML
INJECTION, SOLUTION INTRAMUSCULAR; INTRAVENOUS; SUBCUTANEOUS
Status: COMPLETED
Start: 2023-11-27 | End: 2023-11-27

## 2023-11-27 RX ORDER — ONDANSETRON 2 MG/ML
4 INJECTION INTRAMUSCULAR; INTRAVENOUS EVERY 6 HOURS PRN
Status: DISCONTINUED | OUTPATIENT
Start: 2023-11-27 | End: 2023-11-27 | Stop reason: HOSPADM

## 2023-11-27 RX ORDER — HYDROMORPHONE HYDROCHLORIDE 1 MG/ML
0.8 INJECTION, SOLUTION INTRAMUSCULAR; INTRAVENOUS; SUBCUTANEOUS EVERY 2 HOUR PRN
Status: DISCONTINUED | OUTPATIENT
Start: 2023-11-27 | End: 2023-11-28

## 2023-11-27 RX ORDER — DEXAMETHASONE SODIUM PHOSPHATE 10 MG/ML
INJECTION, SOLUTION INTRAMUSCULAR; INTRAVENOUS AS NEEDED
Status: DISCONTINUED | OUTPATIENT
Start: 2023-11-27 | End: 2023-11-27 | Stop reason: SURG

## 2023-11-27 RX ORDER — ACETAMINOPHEN 500 MG
1000 TABLET ORAL ONCE
Status: DISCONTINUED | OUTPATIENT
Start: 2023-11-27 | End: 2023-11-27 | Stop reason: HOSPADM

## 2023-11-27 RX ORDER — MISOPROSTOL 200 UG/1
TABLET ORAL AS NEEDED
Status: DISCONTINUED | OUTPATIENT
Start: 2023-11-27 | End: 2023-11-27

## 2023-11-27 RX ORDER — OXYCODONE HYDROCHLORIDE 10 MG/1
5 TABLET ORAL EVERY 4 HOURS PRN
Status: DISCONTINUED | OUTPATIENT
Start: 2023-11-27 | End: 2023-11-29

## 2023-11-27 RX ORDER — SENNOSIDES 8.6 MG
17.2 TABLET ORAL NIGHTLY PRN
Status: DISCONTINUED | OUTPATIENT
Start: 2023-11-27 | End: 2023-11-29

## 2023-11-27 RX ORDER — ENOXAPARIN SODIUM 100 MG/ML
40 INJECTION SUBCUTANEOUS DAILY
Status: DISCONTINUED | OUTPATIENT
Start: 2023-11-27 | End: 2023-11-28

## 2023-11-27 RX ORDER — HYDROMORPHONE HYDROCHLORIDE 1 MG/ML
0.4 INJECTION, SOLUTION INTRAMUSCULAR; INTRAVENOUS; SUBCUTANEOUS EVERY 2 HOUR PRN
Status: DISCONTINUED | OUTPATIENT
Start: 2023-11-27 | End: 2023-11-28

## 2023-11-27 RX ORDER — PROCHLORPERAZINE EDISYLATE 5 MG/ML
5 INJECTION INTRAMUSCULAR; INTRAVENOUS EVERY 8 HOURS PRN
Status: DISCONTINUED | OUTPATIENT
Start: 2023-11-27 | End: 2023-11-27 | Stop reason: HOSPADM

## 2023-11-27 RX ORDER — VASOPRESSIN 20 U/ML
INJECTION PARENTERAL AS NEEDED
Status: DISCONTINUED | OUTPATIENT
Start: 2023-11-27 | End: 2023-11-27

## 2023-11-27 RX ORDER — ACETAMINOPHEN 160 MG/5ML
1000 SOLUTION ORAL EVERY 8 HOURS SCHEDULED
Status: DISCONTINUED | OUTPATIENT
Start: 2023-11-27 | End: 2023-11-29

## 2023-11-27 RX ORDER — NALOXONE HYDROCHLORIDE 0.4 MG/ML
0.08 INJECTION, SOLUTION INTRAMUSCULAR; INTRAVENOUS; SUBCUTANEOUS AS NEEDED
Status: DISCONTINUED | OUTPATIENT
Start: 2023-11-27 | End: 2023-11-27 | Stop reason: HOSPADM

## 2023-11-27 RX ORDER — DEXAMETHASONE SODIUM PHOSPHATE 4 MG/ML
VIAL (ML) INJECTION AS NEEDED
Status: DISCONTINUED | OUTPATIENT
Start: 2023-11-27 | End: 2023-11-27 | Stop reason: SURG

## 2023-11-27 RX ORDER — ONDANSETRON 4 MG/1
4 TABLET, FILM COATED ORAL EVERY 8 HOURS PRN
Status: DISCONTINUED | OUTPATIENT
Start: 2023-11-27 | End: 2023-11-29

## 2023-11-27 RX ORDER — NEOSTIGMINE METHYLSULFATE 1 MG/ML
INJECTION, SOLUTION INTRAVENOUS AS NEEDED
Status: DISCONTINUED | OUTPATIENT
Start: 2023-11-27 | End: 2023-11-27 | Stop reason: SURG

## 2023-11-27 RX ADMIN — SODIUM CHLORIDE, SODIUM LACTATE, POTASSIUM CHLORIDE, CALCIUM CHLORIDE: 600; 310; 30; 20 INJECTION, SOLUTION INTRAVENOUS at 10:59:00

## 2023-11-27 RX ADMIN — DEXAMETHASONE SODIUM PHOSPHATE 8 MG: 4 MG/ML VIAL (ML) INJECTION at 07:51:00

## 2023-11-27 RX ADMIN — KETOROLAC TROMETHAMINE 30 MG: 30 INJECTION, SOLUTION INTRAMUSCULAR; INTRAVENOUS at 10:01:00

## 2023-11-27 RX ADMIN — DEXAMETHASONE SODIUM PHOSPHATE 2 MG: 10 INJECTION, SOLUTION INTRAMUSCULAR; INTRAVENOUS at 07:50:00

## 2023-11-27 RX ADMIN — ONDANSETRON 4 MG: 2 INJECTION INTRAMUSCULAR; INTRAVENOUS at 10:22:00

## 2023-11-27 RX ADMIN — ROCURONIUM BROMIDE 50 MG: 10 INJECTION, SOLUTION INTRAVENOUS at 07:36:00

## 2023-11-27 RX ADMIN — NEOSTIGMINE METHYLSULFATE 4 MG: 1 INJECTION, SOLUTION INTRAVENOUS at 10:32:00

## 2023-11-27 RX ADMIN — CEFAZOLIN SODIUM IN 0.9 % NACL 3 G: 3 G/100 ML INTRAVENOUS SOLUTION, PIGGYBACK (ML) INTRAVENOUS at 07:54:00

## 2023-11-27 RX ADMIN — LIDOCAINE HYDROCHLORIDE 50 MG: 10 INJECTION, SOLUTION EPIDURAL; INFILTRATION; INTRACAUDAL; PERINEURAL at 07:36:00

## 2023-11-27 RX ADMIN — GLYCOPYRROLATE 0.8 MG: 0.2 INJECTION, SOLUTION INTRAMUSCULAR; INTRAVENOUS at 10:32:00

## 2023-11-27 RX ADMIN — ROCURONIUM BROMIDE 10 MG: 10 INJECTION, SOLUTION INTRAVENOUS at 09:04:00

## 2023-11-27 NOTE — ANESTHESIA PROCEDURE NOTES
Airway  Date/Time: 11/27/2023 7:39 AM  Urgency: elective    Airway not difficult    General Information and Staff    Patient location during procedure: OR  Anesthesiologist: Jaylene Malloy DO  Performed: anesthesiologist   Performed by: Jaylene Malloy DO  Authorized by: Jaylene Malloy DO      Indications and Patient Condition  Indications for airway management: anesthesia  Spontaneous ventilation: present  Sedation level: deep  Preoxygenated: yes  Patient position: sniffing  Mask difficulty assessment: 1 - vent by mask    Final Airway Details  Final airway type: endotracheal airway      Successful airway: ETT  Cuffed: yes   Successful intubation technique: direct laryngoscopy  Endotracheal tube insertion site: oral  Blade: [place]  Blade size: #4  ETT size (mm): 7.5    Cormack-Lehane Classification: grade IIA - partial view of glottis  Placement verified by: capnometry   Measured from: lips  ETT to lips (cm): 21  Number of attempts at approach: 1  Ventilation between attempts: none  Number of other approaches attempted: 0    Additional Comments  Dentition, lips, gums intact as preinduction

## 2023-11-27 NOTE — TELEPHONE ENCOUNTER
Received from [facility]. Emailed and sent inner office to forms department.   No pmt collected

## 2023-11-27 NOTE — OPERATIVE REPORT
ABDOMINAL MYOMECTOMY      DATE OF PROCEDURE: 23     PRE-OP DIAGNOSIS: SYMPTOMATIC UTERINE FIBROID, AUB, MENORRHAGIA WITH REGULAR CYCLE,      POST-OP DIAGNOSIS: SAME      INDICATIONS: 29year old  female who presents for abdominal myomectomy  AUB, HMB, uterine fibroids and anemia. The patient reports continued AUB w/ HMB. AUB w/ HMB. Monthly but heavy bleeding for 6 days. She reports spotting to following for a few more days. She reports use of pads and diapers. She reports soaking through pads. Changing every 2-3 hours. The patient had an endometrial biopsy that was benign on 2023. Her pelvic ultrasound was noted for enlarged uterus with uterine fibroids and largest measuring 7 cm. She has been on OCP with continued symptoms. She was started on Myfembree. Repeat pelvic US confirmed about same size enlarged uterus. Anemia improved with IV iron. A discussion was held with the patient regarding findings and recommendations. The patient was offered surgical management. The patient was agreeable with recommendations and presents today for scheduled procedure. PROCEDURE(S):    ABDOMINAL MYOMECTOMY      ANESTHESIA: General      SURGEON(S): Dr. Lai Almonte MD     ASSISTANTS: Dr. Barry Cha MD, who was present throughout the entire case and was critical in ensuring adequate exposure for patient's safety and optimization of outcome. The physician actively assisted in retraction, exposure, hemostasis, entry/closure as well as other essential operative technical functions. Findings: Normal external genitalia, no lesions. Enlarged uterus, bulky with large intramural uterine fibroid at fundus measuring about 11 cm and second large intramural uterine fibroid posterior lower uterine segment measuring about 6 cm. Normal bilateral fallopian tubes and ovaries. S/p abdominal myomectomy. Good hemostasis. All instruments removed from abdomen. Good hemostasis noted.  All specimens collected and sent to pathology. Procedure Details:   Risks, benefits, and alternatives of the procedure were discussed with patient including, but not limited to: risk of complications of anesthesia, bleeding, infection, damage to the bowel and urinary structures, adhesion formation, chronic pelvic pain, the possibility of a conversion to an open laparotomy, and post-operative complications. All questions were answered and consents signed. She has received the appropriate DVT prophylaxis. The patient was then taken back to the operating room. She was then placed in the supine position and general anesthesia was initiated with endotracheal intubation. A time out procedure was completed. The vagina was prepped in sterile fashion. A urinary pepper catheter was placed. The abdomen was then prepped and draped in a normal sterile fashion. A vertical incision was made with scalpel down through the subcutaneous tissue from pubic bone up towards the umbilicus. The fascia was identified and incised. The fascial incision was extended with forceps and clinton scissors. The fascia was then  superior and inferior using Kocher clamps and clinton scissors. The midline was identified. The peritoneum entered bluntly. The peritoneal cavity was then confirmed to be entered. The uterus was palpated and noted to be enlarged with bulky uterine fibroids x 2. The bowel was packed was 2 moist sponges. The Lj O self retaining abdominal retractor was placed taking care to avoid bowel. A survey of the pelvis was performed with findings noted above. The decision made to proceed with abdominal myomectomy. The uterus was grasped with a double tooth tenaculum at the fundal fibroid and elevated out of the pelvis. The serosa was infiltrated with a solution of vasopressin 20 units and 100 mL of saline until the serosa was blanched. The serosa was then incised with an electrocautery pen. Allis clamps were used to grasp the serosal edges. The electrocautery pen was used to incise the myometrium until the capsule of the uterine fibroid was clearly identified. The double-tooth tenaculum was then placed on the identified fundal uterine fibroid. The uterine fibroid was elevated and the serosal incision was extended. The fundal fibroid was then removed from the myometrium using traction and countertraction of the along with Metzenbaum scissors, blunt dissection and electrocautery pen. Entry into the endometrial cavity was noted after the removal of the fundal fibroid. Overall good hemostasis noted. Fundal incision was approximately 6 to 7 cm from the left lateral fundus towards the posterior. Uterus is reexamined. A 6-7 cm posterior uterine fibroid in the lower uterine segment was noted. The decision was made to proceed with abdominal myomectomy. The serosa was infiltrated with a solution of vasopressin 20 units and 100 mL of saline until the serosa was blanched. The serosa was then incised with an electrocautery pen. Allis clamps were used to grasp the serosal edges. The electrocautery pen was used to incise the myometrium until the capsule of the uterine fibroid was clearly identified. The double-tooth tenaculum was then placed on the identified posterior lower uterine segment fibroid. The uterine fibroid was elevated and the serosal incision was extended superiorly posterior. The fundal fibroid was then removed from the myometrium using traction and countertraction of the along with Metzenbaum scissors, blunt dissection and electrocautery pen. Entry into the endometrial cavity was noted after the removal of the posterior fibroid. Overall good hemostasis noted. The posterior incision was approximately 3 to 4 cm. No further large uterine fibroids were noted. The posterior uterine incision was closed in multiple layers using 0 Vicryl sutures with the final serosal layer closed using a baseball stitch. Good hemostasis noted.   The fundal uterine incision was closed in multiple layers using 0 Vicryl sutures with the final serosal layer closed using Monocryl suture on SH needle. Good hemostasis noted. Surgicel placed over each incision. The uterus was palpated and firm. The uterus appeared normal in size and shape. The abdominal lap sponges were then removed. The self retaining retractor was then removed. The fascia was re-approximated with PDS loop suture. The subcutaneous layer was re-approximated with interrupted 2-0 plain gut sutures. The skin was then re-approximated with Insorb staples. The abdomen was cleaned. A Prevena wound VAC was placed over the incision and confirmed to be adequately functioning. All instruments were removed. All sponge, lap, and instrument and needle counts were correct times two. The patient tolerated the procedure well and was taken to recovery room in stable condition. ESTIMATED BLOOD LOSS: 600 mL      DRAINS: 800 mL      TOTAL IV FLUIDS: 1600 mL     SPECIMENS: uterine fibroids     IMPLANTS: none     COMPLICATIONS: None      DISPOSITION: awakened from anesthesia, extubated and taken to the recovery room in a stable condition, having suffered no apparent untoward event. Erin Sharp MD   [facility] - OBGYN      Note to patient and family   The [facility] makes medical notes available to patients in the interest of transparency. However, please be advised that this is a medical document. It is intended as jhnz-ps-zwlf communication. It is written and medical language may contain abbreviations or verbiage that are technical and unfamiliar. It may appear blunt or direct. Medical documents are intended to carry relevant information, facts as evident, and the clinical opinion of the practitioner.

## 2023-11-27 NOTE — ANESTHESIA PROCEDURE NOTES
Regional Block    Performed by: Irma Faith DO  Authorized by: Irma Faith DO      General Information and Staff    Start Time:   Anesthesiologist:  Irma Faith DO  Performed by: Anesthesiologist  Patient Location:  OR      Site Identification: real time ultrasound guided and image stored and retrievable    Block site/laterality marked before start: site marked  Reason for Block: at surgeon's request and post-op pain management    Preanesthetic Checklist: 2 patient identifers, IV checked, risks and benefits discussed, monitors and equipment checked, pre-op evaluation, timeout performed, anesthesia consent, sterile technique used, no prohibitive neurological deficits and no local skin infection at insertion site      Procedure Details    Patient Position:   Prep: ChloraPrep    Monitoring:  Cardiac monitor, continuous pulse ox and blood pressure cuff  Block Type:  TAP  Laterality:  Bilateral  Injection Technique:  Single-shot    Needle    Needle Type:  Short-bevel and echogenic  Needle Localization:  Ultrasound guidance  Reason for Ultrasound Use: appropriate spread of the medication was noted in real time and no ultrasound evidence of intravascular and/or intraneural injection            Assessment    Injection Assessment:  Good spread noted, negative resistance, negative aspiration for heme, incremental injection and low pressure  Heart Rate Change: No    - Patient tolerated block procedure well without evidence of immediate block related complications.      Medications      Additional Comments    Medication:  Ropivacaine 0.25% 30mL w 2mg PF dexamethasone on each side

## 2023-11-28 PROBLEM — E66.01 MORBID OBESITY WITH BMI OF 45.0-49.9, ADULT (HCC): Status: ACTIVE | Noted: 2022-08-08

## 2023-11-28 PROBLEM — Z98.890 STATUS POST MYOMECTOMY: Status: ACTIVE | Noted: 2023-11-28

## 2023-11-28 PROBLEM — Z98.890 STATUS POST MYOMECTOMY: Status: ACTIVE | Noted: 2023-11-27

## 2023-11-28 LAB
BASOPHILS # BLD AUTO: 0.02 X10(3) UL (ref 0–0.2)
BASOPHILS NFR BLD AUTO: 0.2 %
CREAT BLD-MCNC: 0.84 MG/DL
EGFRCR SERPLBLD CKD-EPI 2021: 95 ML/MIN/1.73M2 (ref 60–?)
EOSINOPHIL # BLD AUTO: 0 X10(3) UL (ref 0–0.7)
EOSINOPHIL NFR BLD AUTO: 0 %
ERYTHROCYTE [DISTWIDTH] IN BLOOD BY AUTOMATED COUNT: 15.4 %
HCT VFR BLD AUTO: 31.2 %
HGB BLD-MCNC: 10.4 G/DL
IMM GRANULOCYTES # BLD AUTO: 0.05 X10(3) UL (ref 0–1)
IMM GRANULOCYTES NFR BLD: 0.4 %
LYMPHOCYTES # BLD AUTO: 0.91 X10(3) UL (ref 1–4)
LYMPHOCYTES NFR BLD AUTO: 7.3 %
MCH RBC QN AUTO: 28.3 PG (ref 26–34)
MCHC RBC AUTO-ENTMCNC: 33.3 G/DL (ref 31–37)
MCV RBC AUTO: 85 FL
MONOCYTES # BLD AUTO: 0.97 X10(3) UL (ref 0.1–1)
MONOCYTES NFR BLD AUTO: 7.8 %
NEUTROPHILS # BLD AUTO: 10.44 X10 (3) UL (ref 1.5–7.7)
NEUTROPHILS # BLD AUTO: 10.44 X10(3) UL (ref 1.5–7.7)
NEUTROPHILS NFR BLD AUTO: 84.3 %
PLATELET # BLD AUTO: 283 10(3)UL (ref 150–450)
RBC # BLD AUTO: 3.67 X10(6)UL
WBC # BLD AUTO: 12.4 X10(3) UL (ref 4–11)

## 2023-11-28 RX ORDER — ENOXAPARIN SODIUM 100 MG/ML
0.5 INJECTION SUBCUTANEOUS DAILY
Status: DISCONTINUED | OUTPATIENT
Start: 2023-11-29 | End: 2023-11-29

## 2023-11-28 NOTE — PLAN OF CARE
A&Ox4. VSS. RA. Denies chest pain and SOB. GI: Abdomen soft, nondistended. Passing gas. Denies nausea. : pepper in place, to be removed per protocol in am   Pain controlled with scheduled pain medications. Up with standby assist.   Drains: wound vac to neg pressure on lower abdomen  Incisions: lower abdomen- LORNA due to wound vac. Vaginal incision LORNA-bakari pad in place   Diet: Soft diet ordered  IVF running per order. All appropriate safety measures in place.  All questions and concerns addressed

## 2023-11-28 NOTE — PROGRESS NOTES
Patient is saline locked, on room air, pepper removed and patient is voiding well, scheduled Tylenol and Ibuprofen given for pain, tolerating a low fiber diet, ambulates independently, wound vac covering incision-binder placed, bakari pad in place with small/moderate amount of bloody discharge. Possible discharge home tomorrow. Patient updated on plan of care.

## 2023-11-29 ENCOUNTER — TELEPHONE (OUTPATIENT)
Dept: OBGYN CLINIC | Facility: CLINIC | Age: 33
End: 2023-11-29

## 2023-11-29 VITALS
HEART RATE: 70 BPM | SYSTOLIC BLOOD PRESSURE: 116 MMHG | BODY MASS INDEX: 44.41 KG/M2 | HEIGHT: 68 IN | RESPIRATION RATE: 16 BRPM | OXYGEN SATURATION: 96 % | TEMPERATURE: 99 F | WEIGHT: 293 LBS | DIASTOLIC BLOOD PRESSURE: 57 MMHG

## 2023-11-29 RX ORDER — NALOXONE HYDROCHLORIDE 4 MG/.1ML
4 SPRAY NASAL AS NEEDED
Qty: 1 KIT | Refills: 0 | Status: SHIPPED | OUTPATIENT
Start: 2023-11-29

## 2023-11-29 RX ORDER — IBUPROFEN 600 MG/1
600 TABLET ORAL EVERY 6 HOURS PRN
Qty: 30 TABLET | Refills: 0 | Status: SHIPPED | OUTPATIENT
Start: 2023-11-29

## 2023-11-29 RX ORDER — PSEUDOEPHEDRINE HCL 30 MG
100 TABLET ORAL 2 TIMES DAILY PRN
Qty: 30 CAPSULE | Refills: 0 | Status: SHIPPED | OUTPATIENT
Start: 2023-11-29

## 2023-11-29 RX ORDER — OXYCODONE HYDROCHLORIDE 10 MG/1
10 TABLET ORAL EVERY 6 HOURS PRN
Qty: 20 TABLET | Refills: 0 | Status: SHIPPED | OUTPATIENT
Start: 2023-11-29

## 2023-11-29 NOTE — PROGRESS NOTES
NURSING DISCHARGE NOTE    Discharged Home via Wheelchair. Accompanied by Support staff  Belongings Taken by patient/family. Pt in stable condition and reports feeling ready to go home. Discharge instructions given, pt verbalizes understanding. All questions answered. Discharge medications delivered by pharm tech.

## 2023-11-29 NOTE — DISCHARGE SUMMARY
[facility]  Discharge Summary    Raj Marin Patient Status:  inpatient    3/18/1990 MRN [id number]   Location 329/329-A Attending [facility]   Hosp Day # 2 PCP Dorma Closs, MD     Date of Admission: 2023    Date of Discharge: 23    Admitting Diagnosis: Abnormal uterine bleeding [N93.9]  Uterine leiomyoma, unspecified location [D25.9]  Menorrhagia with regular cycle [N92.0]  Anemia, unspecified type [D64.9]    Discharge Diagnosis: s/p abdominal myomectomy     Hospital Course: The patient is a 29year old female now  who was admitted on 23 s/p abdominal myomectomy. Please refer to operative note for further details. POD#1, the patient was doing well. Her pain was well controlled. POD#2, the patient continued to do well. She was ambulating and voiding without difficulty. She was tolerating a general diet. Pain was well controlled. The patient continued to do well and was meeting post operative expectations. She was discharged to home and instructed to follow up in 1 week. Consultations: none    Procedures: abdominal myomectomy    Complications: none    Discharge Condition: Stable    Discharge Medications: Current Discharge Medication List       Medication List        START taking these medications      docusate sodium 100 MG Caps  Commonly known as: COLACE  Take 100 mg by mouth 2 (two) times daily as needed for constipation. Naloxone HCl 4 MG/0.1ML Liqd  4 mg by Nasal route as needed. If patient remains unresponsive, repeat dose in other nostril 2-5 minutes after first dose. oxyCODONE 10 MG Tabs  Take 1 tablet (10 mg total) by mouth every 6 (six) hours as needed. CHANGE how you take these medications      ibuprofen 600 MG Tabs  Commonly known as: Motrin  Take 1 tablet (600 mg total) by mouth every 6 (six) hours as needed for Pain.   What changed:   medication strength  how much to take  reasons to take this            CONTINUE taking these medications fluticasone propionate 50 MCG/ACT Susp  Commonly known as: Flonase  2 sprays by Each Nare route daily. folic acid 1 MG Tabs  Commonly known as: Folvite  Take 1 tablet (1 mg total) by mouth daily. Phentermine HCl 15 MG Caps  Take 1 capsule (15 mg total) by mouth every morning. STOP taking these medications      Myfembree 40-1-0.5 MG Tabs  Generic drug: Relugolix-Estradiol-Norethind               Where to Get Your Medications        These medications were sent to [facility], [phone], [phone]  [facility], [address] [facility], [address]      Phone: [phone]   docusate sodium 100 MG Caps  ibuprofen 600 MG Tabs  Naloxone HCl 4 MG/0.1ML Liqd  oxyCODONE 10 MG Tabs           Follow up Visits: Follow-up with [facility] in 1 week      Dominique Lion MD   [facility] - [facility]      Note to patient and family   The [facility] makes medical notes available to patients in the interest of transparency. However, please be advised that this is a medical document. It is intended as [id number] communication. It is written and medical language may contain abbreviations or verbiage that are technical and unfamiliar. It may appear blunt or direct. Medical documents are intended to carry relevant information, facts as evident, and the clinical opinion of the practitioner.

## 2023-11-29 NOTE — PLAN OF CARE
Patient alert and orientated x4. VSS on RA. Denies SOB, chest pain, and nausea. Abdomen soft and nondistended. BM 11/29/2023. Wound vac covering incisions. Tolerating soft/ low fiber diet. Voids. Josi pad in place with minimal drainage. Up independently. Pain managed with scheduled tylenol and motrin. All questions and concerns addressed. Safety precautions in place. Call light within reach.

## 2023-11-29 NOTE — DISCHARGE INSTRUCTIONS
Home Care Instructions Following Your Hysterectomy      Karyn-  We hope you were pleased with your care at [facility].  We wish you the best outcome and overall experience with your operation. These instructions will help to minimize pain, limit the risk for infection, and improve the likelihood of a successful result. What to Expect:  Expect some slight vaginal bleeding for 1-2 weeks after your procedure. Use pads only, No tampons  You may have mild abdominal pain at the incision site for a couple of weeks    Bandages (Dressing)  Keep dressings clean and dry   Do not remove your bandages until 7 days after your procedure    Bathing/Showers  You may resume showers in 1 day  No baths, swimming, or hot tubs for 2 weeks      Pain Medication if Prescribed  Oxycodone: Take one tablet every 6 hours as needed for pain if prescribed by your provider  Do not exceed 6 tablets of oxycodone each day  Do not take oxycodone if you do not have pain    Over-The-Counter Medication  Non-prescription anti-inflammatory medications can also help to ease the pain. You may take Aleve, Tylenol or Ibuprofen   You may alternate pain medication: Ibuprofen 600 mg by mouth every 6 hours as needed for pain. Tylenol 500 mg by mouth every 6 hours as needed for pain.  Therefore, you may alternate pain medication every 3 hours: Ibuprofen 600 mg, 3 hours later Tylenol 500 mg and 3 hours later Ibuprofen again   Take as directed on the bottle  Drink a full glass of water with the medication    Home Medication  Resume your home medications as instructed    Diet  Resume your normal diet    Activity  No strenuous activity or heavy lifting for 6 weeks  You may go up and down the stairs as tolerated  No physical exercise, sports, or gym workouts for 6 weeks  No sexual activity for 6 weeks    Return to Work or [place] may not return to work or school until you are released by your gynecologist  You may return to work or school in 6 weeks if you are released by your gynecologist  No gym class or sports for 6 weeks  Contact your gynecologist's office if you need a medical note    Driving  Do not drive if you are taking prescribed pain medication      Follow-up Appointment with Your Gynecologist  Call your gynecologist's office as soon as possible to schedule a follow up appointment in two weeks    Verify your appointment date, day, time, and location  At your postoperative appointment, your wounds will be evaluated, healing assessed, and any additional concerns and instructions will be discussed    Questions or Concerns  Call the office if you experience severe pain not controlled by pain medication, swelling, heavy bleeding, foul smelling vaginal discharge, shortness of breath, chest pain, leg pain, fever (100.4 or greater), or other concerns  The number is: [phone]  If your call is made after office hours, the physician on call will be available to help you. There is always a doctor from the group covering our gynecology patients, when your provider is unavailable      Librado Donahue  Thank you for coming to [facility] for your operation. The nurses and the anesthesiologist try very hard to make sure you receive the best care possible. Your trust in them as well as us is greatly appreciated.     Thanks so much,  [facility]

## 2023-11-29 NOTE — TELEPHONE ENCOUNTER
----- Message from Pramod Cat MD sent at 11/29/2023  8:10 AM CST -----  Regarding: Please help patient schedule a postoperative visit  Patient had an abdominal myomectomy. Wound VAC in place. Wound VAC needs to be removed by 12/4/2023. Please schedule patient with me on 12/4/2023. You may add her on at 10:45 AM if it works for the patient. She is to keep her 2-week postoperative visit for now until I see her in the office on Monday. Thank you.

## 2023-11-29 NOTE — PLAN OF CARE
Pt A&Ox4, resting in bed. Resp: RA,   GI/: passing gas, voids freely  Activity/Safety: up ad addison  Skin: abd incisions x2 w/w.vac  Pain: Tylenol given  Pt updated on and agreeable to POC; discharge today with 1 wk OBGYN follow-up.

## 2023-11-30 NOTE — PAYOR COMM NOTE
--------------  DISCHARGE REVIEW    Payor: Fantasma Bueno #:  [id number]  Authorization Number: [id number]    Admit date: 23  Admit time:   5:28 AM  Discharge Date: 2023 12:34 PM     Admitting Physician: Elda Schneider MD  Attending Physician:  No att. providers found  Primary Care Physician: Marilee Guevara MD          Discharge Summary Notes        Discharge Summary signed by Elda Schneider MD at 2023  8:09 AM       Author: Elda Schneider MD Specialty: OBSTETRICS & GYNECOLOGY Author Type: Physician    Filed: 2023  8:09 AM Date of Service: 2023  8:08 AM Status: Signed    : Elda Schneider MD (Physician)         [facility]  Discharge Summary    Reynaldo Glass Patient Status:  inpatient    3/18/1990 MRN [id number]   Location 329/329-A Attending [facility]   Hosp Day # 2 PCP Brittanie Kearns MD     Date of Admission: 2023    Date of Discharge: 23    Admitting Diagnosis: Abnormal uterine bleeding [N93.9]  Uterine leiomyoma, unspecified location [D25.9]  Menorrhagia with regular cycle [N92.0]  Anemia, unspecified type [D64.9]    Discharge Diagnosis: s/p abdominal myomectomy     Hospital Course: The patient is a 29year old female now  who was admitted on 23 s/p abdominal myomectomy. Please refer to operative note for further details. POD#1, the patient was doing well. Her pain was well controlled. POD#2, the patient continued to do well. She was ambulating and voiding without difficulty. She was tolerating a general diet. Pain was well controlled. The patient continued to do well and was meeting post operative expectations. She was discharged to home and instructed to follow up in 1 week.      Consultations: none    Procedures: abdominal myomectomy    Complications: none    Discharge Condition: Stable    Discharge Medications: Current Discharge Medication List       Medication List START taking these medications      docusate sodium 100 MG Caps  Commonly known as: COLACE  Take 100 mg by mouth 2 (two) times daily as needed for constipation. Naloxone HCl 4 MG/0.1ML Liqd  4 mg by Nasal route as needed. If patient remains unresponsive, repeat dose in other nostril 2-5 minutes after first dose. oxyCODONE 10 MG Tabs  Take 1 tablet (10 mg total) by mouth every 6 (six) hours as needed. CHANGE how you take these medications      ibuprofen 600 MG Tabs  Commonly known as: Motrin  Take 1 tablet (600 mg total) by mouth every 6 (six) hours as needed for Pain. What changed:   medication strength  how much to take  reasons to take this            CONTINUE taking these medications      fluticasone propionate 50 MCG/ACT Susp  Commonly known as: Flonase  2 sprays by Each Nare route daily. folic acid 1 MG Tabs  Commonly known as: Folvite  Take 1 tablet (1 mg total) by mouth daily. Phentermine HCl 15 MG Caps  Take 1 capsule (15 mg total) by mouth every morning. STOP taking these medications      Myfembree 40-1-0.5 MG Tabs  Generic drug: Relugolix-Estradiol-Norethind               Where to Get Your Medications        These medications were sent to [facility], [phone], [phone]  [place] [address], [address] [facility], [address] [place] [address] Drive      Phone: [phone]   docusate sodium 100 MG Caps  ibuprofen 600 MG Tabs  Naloxone HCl 4 MG/0.1ML Liqd  oxyCODONE 10 MG Tabs           Follow up Visits: Follow-up with [facility] in 1 week      Garfield Leigh MD   [facility] - OBGYN      Note to patient and family   The [facility] makes medical notes available to patients in the interest of transparency. However, please be advised that this is a medical document. It is intended as wbzz-gu-mhxu communication. It is written and medical language may contain abbreviations or verbiage that are technical and unfamiliar.   It may appear blunt or direct. Medical documents are intended to carry relevant information, facts as evident, and the clinical opinion of the practitioner.               Electronically signed by Tahir Joseph MD on 11/29/2023  8:09 AM         REVIEWER COMMENTS

## 2023-12-04 ENCOUNTER — OFFICE VISIT (OUTPATIENT)
Dept: OBGYN CLINIC | Facility: CLINIC | Age: 33
End: 2023-12-04
Payer: MEDICAID

## 2023-12-04 VITALS
DIASTOLIC BLOOD PRESSURE: 86 MMHG | BODY MASS INDEX: 49 KG/M2 | HEART RATE: 71 BPM | WEIGHT: 293 LBS | SYSTOLIC BLOOD PRESSURE: 120 MMHG

## 2023-12-04 DIAGNOSIS — Z98.890 S/P MYOMECTOMY: Primary | ICD-10-CM

## 2023-12-04 PROCEDURE — 99024 POSTOP FOLLOW-UP VISIT: CPT | Performed by: OBSTETRICS & GYNECOLOGY

## 2023-12-04 PROCEDURE — 3074F SYST BP LT 130 MM HG: CPT | Performed by: OBSTETRICS & GYNECOLOGY

## 2023-12-04 PROCEDURE — 3079F DIAST BP 80-89 MM HG: CPT | Performed by: OBSTETRICS & GYNECOLOGY

## 2023-12-11 ENCOUNTER — TELEMEDICINE (OUTPATIENT)
Dept: INTERNAL MEDICINE CLINIC | Facility: CLINIC | Age: 33
End: 2023-12-11
Payer: MEDICAID

## 2023-12-11 DIAGNOSIS — E66.01 MORBID OBESITY (HCC): ICD-10-CM

## 2023-12-11 DIAGNOSIS — R06.83 SNORING: ICD-10-CM

## 2023-12-11 DIAGNOSIS — Z98.890 S/P MYOMECTOMY: ICD-10-CM

## 2023-12-11 DIAGNOSIS — Z51.81 THERAPEUTIC DRUG MONITORING: Primary | ICD-10-CM

## 2023-12-11 RX ORDER — TOPIRAMATE 25 MG/1
25 TABLET ORAL 2 TIMES DAILY
Qty: 60 TABLET | Refills: 2 | Status: SHIPPED | OUTPATIENT
Start: 2023-12-11

## 2023-12-11 NOTE — PATIENT INSTRUCTIONS
Next steps:  1. Fill your prescribed medication and take as discussed and prescribed: phentermine   Topamax: take 1 tablet before dinner for the first week (to decrease side effects) and than the second week--> increase to 1 tablet in the morning and 1 tablet before dinner (2 tablets per day)    2. Schedule a personal nutrition consultation with one of our registered dieticians     Please try to work on the following dietary changes:  Daily protein recommendation to start:  grams  Daily carbohydrate: <170g  Daily calories: 2,100-2,200  1. Drink water with meals and throughout the day, cut down on soda and/or juice if consumed. Consider flavored water options like Bubbly, Spindrift, Hint and Cinthya. 2.  Eat breakfast daily and focus on having protein with each meal, examples include: greek yogurt, cottage cheese, hard boiled egg, whole grain toast with peanut butter. 3.  Reduce refined carbohydrates and sugars which includes items such as sweets, as well as rice, pasta, and bread and make sure to choose whole grain options when having them with just 1 serving per meal about the size of your inner palm. 4.  Consume non starchy veggies daily working towards making them a good 50% of your daily food intake. Add them to lunch and dinner consistently. 5.  Start a daily probiotic: VSL#3 is recommended, (order on line at www.vsl3. com). Take 1 capsule daily with water for 30 days, then reduce to 1 every other day (this will reduce the cost). Capsules can be left out for 2 weeks, but then must be refrigerated. Please download svetlana My Fitness Chary Pandey! Or Net Diary to monitor daily dietary intake and you will be able to see if you are eating the right amount of calories or too much or too little which would hinder weight loss. Additionally this will help to see your daily carbohydrate and protein intake.  When you set the svetlana up choose 1-2 lbs/week as a goal.  Keeping a paper food journal is an option as well to remain accountable for your choices- this is the start to mindful eating! A low calorie diet has been consistently shown to support weight loss. Continue or start exercising to help establish a routine. If not already exercising begin with 1 day and progress as able with long-term goal of 30 minutes 5 days a week at a minimum. Meditation daily can help manage and control stress. Chronic stress can make weight loss difficult. Exercising is one way to help with stress, but meditation using the CALM Ravin or another comparable alternative can be done in your home or place of work with little time commitment. This Ravin can also help work on behavior change and improve sleep. Check out the segment under Calm Masterclass and listen to The 4 Pillars of Health. A great way to begin learning about the foundation of lifestyle with practical tips to use in your every day. Check out www.yourweightmatters. org blog for continued daily support and education along this weight loss journey! Patient Resources:     Personal Training/Fitness Classes/Health Coaching     [facility] and [place] @ [email or web address].[email or web address] Memorial Hospitalyes.concepcion/ Full fitness center with group fitness and personal training. Discount available as client of [facility]. Health Coaching and Personal Training with Ruid Donahue at our [facility]- individual weekly coaching with option to add personal training and small group fitness classes targeted at weight loss- [phone] and/or email @ Ba [email or web address]  360FIT [place] [email or web address] Group Fitness [phone] and/or email Sujey Poon at [email or web address]  [address] with multiple locations: [employer] (www.Poachable. BrakeQuotes.com), Eat The Frog Fitness (www.Nethra Imaging. BrakeQuotes.com), Fit Body Bootcamp (www.SixDoorsp.BrakeQuotes.com), Fundly (www.SvitStyle. BrakeQuotes.com), The Exercise  (www.exercisecoach.[place].com)     Online Fitness  Fitness  on [employer] in 10 DVD series- www. esheq47CRA. [place].com  Sit and Be Fit - Chair exercise series Www.sitandbefit. org  Hip Hop Fit with Vamsi Art at www.hiphopfit. net     Apps for on the Appconomy 7 Minute Workout (orange box with white 7) - free on the go HIIT training ravin  Peloton Ravin @ wwwGrata     Nutrition Trackers and Tools  LoseIT! And My Fitness Pal apps and on line for tracking nutrition  NOOM - virtual health coaching  FitFoundation (healthy meals on the go) in [facility] @ wwwDonorPro mzmwyxznffwnv0v. Álvaro Jennings MD @ wwwImpeva and Elsy Menjivar (keto and low carb plans recommended) @ www. MIZOXL22.XVG, Metabolic Meals @ www. MyMetabolicMeals. com - individual prepared meals to go  Meddik, eduPad, [employer], Every Plate, Ravti- on line meal delivery programs for preparation at home  [place] [employer] in [place] for homemade meals to go @ wwwCitiLogics  Diet Doctor @ www. dietdoctor. com - low carb swaps  YuMailgun - meal prep and planning ravin (www.yummly. com)     Stress Management/Behavior/Mindful Eating  CALM meditation ravin (www.calmSterraClimb)  Headspace  Am I Hungry? Mindful eating virtual  ravin  Www.yourweightmatters. org - Obesity Action Coalition sponsored Blog posts daily  Motivation ravin (black box with white \")- daily supportive messages sent to your phone     Books/Video Education/Podcasts  Mindless Eating by Rosana Hdz  Why We Get Sick by Chriss Gonsalves (a book about insulin resistance)  Atomic Habits by Shelly Corrales (a book about taking small steps to promote greater behavior change)   Can't Hurt Me by Arnav Jones (a book exploring the power of discipline in achieving your goals)  The End of Dieting: How to Live for Life by Dr. Tiffanie Brownlee M.D. or listen to The [address] NextUser [facility] [address] Episode 61: Understanding \"Nutritarian\" Eating w/Dr. Tiffanie Brownlee  Your Body in Balance:  The [employer], Hormones, and Health by Dr. Mannie Egan  The Menopause Diet Plan by Natalia Carrington and [facility]  The Complete Guide to fasting by Dr. Zandra Amaya, [facility] by My Nunez, Ph.D, R.D. Weight Loss Surgery Will Not Treat Food Addiction by Harriet Buerger Ph.D  The [address] [place] [address] on plant based nutrition  Fed Up - documentary about obesity (Free on [place])  The Truth About Sugar - documentary on sugar (Free on [employer]ense, https://youtu. be/3J5djwpCZ4e)  The Dr. Mary Solis by Dr. Judah Ferreira MD  Fitlosophy Fitspiration - journal to better health (found at [employer] in fitness aisle)  What Happened to You?- a look at the impact trauma has on behavior written by Sandra Castañeda and Dr. Josee Wayne Again by Manuella Alpers - discovering your true self after trauma  Deedee Luong talk on Sarta, The Call to Courage  Podcasts: The Exam Room by the Physician's Committee, Nutrition Facts by Dr. Mervat Jacob    We are here to support you with weight loss, but please remember that you still need your primary care provider for your routine health maintenance.

## 2023-12-11 NOTE — PROGRESS NOTES
[facility] WEIGHT MANAGEMENT VIRTUAL ENCOUNTER     Shailesh Gardner verbally consents to a Virtual/Telephone Check-In service on 12/11/23   Patient understands and accepts financial responsibility for any deductible, co-insurance and/or co-pays associated with this service. HISTORY OF PRESENT ILLNESS  Chief Complaint   Patient presents with    Other     F/u on weight management      Shailesh Gardner is a 29year old female is being evaluated as a video visit using Telemedicine with live, interactive video and audio    Weight gain/loss since LOV based on home monitoring:   Home scale: 305 lbs  Has lost  #5 lbs since LOV 2 months ago     Compliance with medication: phentermine 15mg   Tolerating well, helping with decreasing appetite and no side effects     Is still having sugar cravings   Had abdominal myomectomy surgery on 11/27/23- removed (11 cm, 7 cm fibroids removed) -is currently on light lifting x 6 weeks   Exercise/Activity: 4x/ week, via walking  Nutrition: eating regular meals, +protein, minimal veggies. + tracking reports- [facility]  Stress is manageable   Sleep: 6-7 hours/night, waking up feeling rested    Denies chest pain, shortness of breath, dizziness, blurred vision, headache, paresthesia, nausea/vomiting.      Wt Readings from Last 6 Encounters:   12/04/23 (!) 310 lb (140.6 kg)   11/27/23 (!) 316 lb 8 oz (143.6 kg)   11/08/23 (!) 313 lb 3.2 oz (142.1 kg)   11/01/23 (!) 313 lb 3.2 oz (142.1 kg)   09/27/23 (!) 313 lb 12.8 oz (142.3 kg)   09/26/23 (!) 312 lb (141.5 kg)          Subjective  REVIEW OF SYSTEMS  GENERAL HEALTH: feels well otherwise, denied any fevers chills or night sweats   RESPIRATORY: denies shortness of breath   CARDIOVASCULAR: denies chest pain  GI: denies abdominal pain  PSYCH: denies any mood changes    Objective  EXAM  Reviewed most recent set of vitals   Physical Exam:  GENERAL: well developed, well nourished, in no apparent distress, speaking in full sentences comfortably   SKIN: warm, pink, dry without rashes to exposed area   EYES: conjunctiva pink  HEENT: atraumatic, normocephalic  LUNGS: normal work of breathing, non labored  CARDIO: normal work, no exertion  EXTREMITIES: no cyanosis, no clubbing, no edema  NEURO: Oriented times three  PSYCH: pleasant, cooperative, normal mood and affect    Lab Results   Component Value Date    WBC 12.4 (H) 11/28/2023    RBC 3.67 (L) 11/28/2023    HGB 10.4 (L) 11/28/2023    HCT 31.2 (L) 11/28/2023    MCV 85.0 11/28/2023    MCH 28.3 11/28/2023    MCHC 33.3 11/28/2023    RDW 15.4 11/28/2023    .0 11/28/2023     Lab Results   Component Value Date    GLU 83 11/01/2023    BUN 8 11/01/2023    BUNCREA SEE NOTE: 11/01/2023    CREATSERUM 0.84 11/28/2023    ANIONGAP 4 09/01/2023    CA 9.2 11/01/2023    OSMOCALC 292 09/01/2023    ALKPHO 46 11/01/2023    AST 14 11/01/2023    ALT 20 11/01/2023    BILT 0.3 11/01/2023    TP 6.7 11/01/2023    ALB 4.2 11/01/2023    GLOBULIN 2.5 11/01/2023    AGRATIO 1.7 11/01/2023     11/01/2023    K 4.0 11/01/2023     11/01/2023    CO2 27 11/01/2023     Lab Results   Component Value Date    A1C 4.6 11/01/2023     Lab Results   Component Value Date    CHOLEST 155 08/22/2022    TRIG 63 08/22/2022    HDL 40 (L) 08/22/2022     (H) 08/22/2022    TCHDLRATIO 3.9 08/22/2022    [place] 115 08/22/2022     Lab Results   Component Value Date    TSHT4 1.47 08/22/2022     Lab Results   Component Value Date    B12 516 12/29/2022     No results found for: \"VITD\", \"QVITD\", \"MUWG21CJ\"    Current Outpatient Medications on File Prior to Visit   Medication Sig Dispense Refill    ibuprofen 600 MG Oral Tab Take 1 tablet (600 mg total) by mouth every 6 (six) hours as needed for Pain. 30 tablet 0    docusate sodium 100 MG Oral Cap Take 100 mg by mouth 2 (two) times daily as needed for constipation. 30 capsule 0    oxyCODONE 10 MG Oral Tab Take 1 tablet (10 mg total) by mouth every 6 (six) hours as needed.  20 tablet 0    Naloxone HCl 4 MG/0.1ML Nasal Liquid 4 mg by Nasal route as needed. If patient remains unresponsive, repeat dose in other nostril 2-5 minutes after first dose. 1 kit 0    Phentermine HCl 15 MG Oral Cap Take 1 capsule (15 mg total) by mouth every morning. 30 capsule 2    fluticasone propionate 50 MCG/ACT Nasal Suspension 2 sprays by Each Nare route daily. 1 each 1    folic acid 1 MG Oral Tab Take 1 tablet (1 mg total) by mouth daily. 90 tablet 3     No current facility-administered medications on file prior to visit. ASSESSMENT  Analyzed weight data:       Diagnoses and all orders for this visit:    Therapeutic drug monitoring    Morbid obesity ([facility] [address]    Snoring    Iron deficiency anemia due to chronic blood loss        PLAN  Continue with medications: phentermine 15mg (would like to stay at the same dosage)   Topamax: take 1 tablet before dinner for the first week (to decrease side effects) and than the second week--> increase to 1 tablet in the morning and 1 tablet before dinner (2 tablets per day)  Will start Topamax for craving and weight loss discussed with the patient side effects, patient reports no hx of kidney stones. Discussed if paraesthesia's, numbness, cognitive or memory issues to stop the medication and notify MD. Discussed the risk of congenital birth defects and contraindication in pregnancy and to stop at least 2 months before pregnancy planning. Patient verbalized understanding agrees with the plan. --advised of side effects and adverse effects of this medication  Contradictions: none  Reviewed labs   Anemia, currently getting iron infusions   S/p surgery- see HPI (on light lifting restrictions)   Referral to behavioral psych with last appt   Snoring, can think about doing sleep study in the future   Wrote out macros and encouraged tracking  Advised to monitor blood pressure and pulse at home/ given parameters to review and contact provider.   Nutrition: low carb diet/ recommended to eat breakfast daily/ regular protein intake  Follow up with dietitian and psychologist as recommended. Discussed the role of sleep and stress in weight management. Counseled on comprehensive weight loss plan including attention to nutrition, exercise and behavior/stress management for success. See patient instruction below for more details. Discussed strategies to overcome barriers to successful weight loss and weight maintenance  FITTE: ACSM recommendations (150-300 minutes/ week in active weight loss)       Patient Instructions   Next steps:  1. Fill your prescribed medication and take as discussed and prescribed:   Topamax: take 1 tablet before dinner for the first week (to decrease side effects) and than the second week--> increase to 1 tablet in the morning and 1 tablet before dinner (2 tablets per day)    2. Schedule a personal nutrition consultation with one of our registered dieticians     Please try to work on the following dietary changes:  Daily protein recommendation to start:  grams  Daily carbohydrate: <170g  Daily calories: 2,100-2,200  1. Drink water with meals and throughout the day, cut down on soda and/or juice if consumed. Consider flavored water options like Bubbly, Spindrift, Hint and Cinthya. 2.  Eat breakfast daily and focus on having protein with each meal, examples include: greek yogurt, cottage cheese, hard boiled egg, whole grain toast with peanut butter. 3.  Reduce refined carbohydrates and sugars which includes items such as sweets, as well as rice, pasta, and bread and make sure to choose whole grain options when having them with just 1 serving per meal about the size of your inner palm. 4.  Consume non starchy veggies daily working towards making them a good 50% of your daily food intake. Add them to lunch and dinner consistently. 5.  Start a daily probiotic: VSL#3 is recommended, (order on line at www.vsl3. com).  Take 1 capsule daily with water for 30 days, then reduce to 1 every other day (this will reduce the cost). Capsules can be left out for 2 weeks, but then must be refrigerated. Please download ravin My Fitness Eric Perez! Or Net Diary to monitor daily dietary intake and you will be able to see if you are eating the right amount of calories or too much or too little which would hinder weight loss. Additionally this will help to see your daily carbohydrate and protein intake. When you set the ravin up choose 1-2 lbs/week as a goal.  Keeping a paper food journal is an option as well to remain accountable for your choices- this is the start to mindful eating! A low calorie diet has been consistently shown to support weight loss. Continue or start exercising to help establish a routine. If not already exercising begin with 1 day and progress as able with long-term goal of 30 minutes 5 days a week at a minimum. Meditation daily can help manage and control stress. Chronic stress can make weight loss difficult. Exercising is one way to help with stress, but meditation using the CALM Ravin or another comparable alternative can be done in your home or place of work with little time commitment. This Ravin can also help work on behavior change and improve sleep. Check out the segment under Calm Masterclass and listen to The 4 Pillars of Health. A great way to begin learning about the foundation of lifestyle with practical tips to use in your every day. Check out www.yourweightmatters. org blog for continued daily support and education along this weight loss journey! Patient Resources:     Personal Training/Fitness Classes/Health Coaching     Michelle Chowdhury and Parson Sophiaside @ [email or web address]/ Full fitness center with group fitness and personal training. Discount available as client of [facility].   Health Coaching and Personal Training with Tres Barone at our [facility]- individual weekly coaching with option to add personal training and small group fitness classes targeted at weight loss- [phone] and/or email @ Gracie Chicas. [email or web address]  360FIT [place] [email or web address] Group Fitness [phone] and/or email Abad mustafa at [email or web address]  [address] [facility] with multiple locations: [employer]et[employer] (www.GetAutoBids), Eat The Combinature [employer] Fitness (www.Team Everest), Fit Body Bootcamp (www.Luxoftbodybootcamp.Homestay.com), Snapt (www.Brainsgate), The Exercise  (www.exercisecoachSnapt)     Online Fitness  Fitness  on [employer] in 10 DVD series- www. plvut90DVT. Homestay.com  Sit and Be Fit - Chair exercise series Www.sitandbefit. org  Hip Hop Fit with Gene Art at www.hiphopfit. net     Apps for on the "Healthy Stove[employer] Inc." 7 Minute Workout (orange box with white 7) - free on the go HIIT training ravin  Peloton Ravin @ RealtyAPX     Nutrition Trackers and Tools  LoseIT! And My Fitness Pal apps and on line for tracking nutrition  NOOM - virtual health coaching  FitFoundation (healthy meals on the go) in [facility] @ www. cltaokpgklrnd9f. Feliberto Rubio MD @ www.Albireod.Homestay.com and Anjel Yost (keto and low carb plans recommended) @ www. QCYKXY34.JXT, Metabolic Meals @ www. MyMetabolicMeals. com - individual prepared meals to go  Angella Joy, [employer], Every Plate, [employer]- on line meal delivery programs for preparation at home  [employer] in [address] [place] [address] for homemade meals to go @ www.mealvillage. com  Diet Doctor @ www. dietdoctor. com - low carb swaps  YummInsightpool - meal prep and planning ravin (www.yummly. com)     Stress Management/Behavior/Mindful Eating  CALM meditation ravin (www.calm7signal Solutions)  Headspace  Am I Hungry? Mindful eating virtual  ravin  Www.yourweightmatters. org - Obesity Action Coalition sponsored Blog posts daily  Motivation ravin (black box with white \")- daily supportive messages sent to your phone     Books/Video Education/Podcasts  Mindless Eating by Vlad Wood Wansink  Why We Get Sick by Shanti Levy (a book about insulin resistance)  Atomic Habits by Jodi Molina (a book about taking small steps to promote greater behavior change)   Can't Hurt Me by Michelle Delgado (a book exploring the power of discipline in achieving your goals)  The End of Dieting: How to Live for Life by Dr. Mariano Enriquez M.D. or listen to The [address] Episode 61: Understanding \"Nutritarian\" Eating w/Dr. Mariano Enriquez  Your Body in Balance: The [employer] of Food, Hormones, and Health by Dr. Sejal Butt  The Menopause Diet Plan by Alex Bonds and [facility] AT [facility]  The Complete Guide to fasting by Dr. Charmaine Hudson, [address] by Solomon Castaneda, Ph.D, R.D. Weight Loss Surgery Will Not Treat Food Addiction by Shailesh Ballesteros Ph.D  The [address] [place] [address] on plant based nutrition  Fed Up - documentary about obesity (Free on Liquid Accounts)  The Truth About Sugar - documentary on sugar (Free on Koality, https://youStellarcasa SAu. be/4Y7rxypMD9b)  The Dr. Luis Alfredo Vasquez by Dr. Horacio Pedro MD  Fitlosophy Fitspiration - journal to better health (found at [employer] in fitness aisle)  What Happened to You?- a look at the impact trauma has on behavior written by Starla Zavala and Dr. Arvind Silva Again by Gloria Anderson - discovering your true self after trauma  Samantha Leung talk on 5151tuan, The Call to MideoMe  Podcasts: The Exam Room by the Physician's Committee, Nutrition Facts by Dr. Devin Langston    We are here to support you with weight loss, but please remember that you still need your primary care provider for your routine health maintenance. No follow-ups on file. Patient verbalizes understanding. Total time spent on chart review, pre-charting, obtaining history, counseling, and educating, reviewing labs was 25 minutes. Pt understands phone/video evaluation is not a substitute for face to face examination or emergency care.  Pt advised to go to the ER or call 911 for worsening symptoms or acute distress. Please note that the following visit was completed using two-way, real-time interactive audio and/or video communication. This has been done in good amy to provide continuity of care in the best interest of the provider-patient relationship, due to the ongoing public health crisis/national emergency and because of restrictions of visitation. There are limitations of this visit as no physical exam could be performed. Every conscious effort was taken to allow for sufficient and adequate time. This billing was spent on reviewing labs, medications, radiology tests and decision making. Appropriate medical decision-making and tests are ordered as detailed in the plan of care above. NOTE TO PATIENT: The 21st Century Cures Act makes clinical notes like these available to patients in the interest of transparency. Clinical notes are medical documents used by physicians and care providers to communicate with each other. These documents include medical language and terminology, abbreviations, and treatment information that may sound technical and at times possibly unfamiliar. In addition, at times, the verbiage may appear blunt or direct. These documents are one tool providers use to communicate relevant information and clinical opinions of the care providers in a way that allows common understanding of the clinical context.      Shahid Newsome, APRN  12/10/2023

## 2023-12-13 ENCOUNTER — OFFICE VISIT (OUTPATIENT)
Dept: OBGYN CLINIC | Facility: CLINIC | Age: 33
End: 2023-12-13
Payer: MEDICAID

## 2023-12-13 VITALS
BODY MASS INDEX: 45.99 KG/M2 | HEART RATE: 72 BPM | RESPIRATION RATE: 16 BRPM | DIASTOLIC BLOOD PRESSURE: 88 MMHG | SYSTOLIC BLOOD PRESSURE: 122 MMHG | WEIGHT: 293 LBS | HEIGHT: 67 IN

## 2023-12-13 DIAGNOSIS — Z98.890 S/P MYOMECTOMY: Primary | ICD-10-CM

## 2023-12-13 PROCEDURE — 3008F BODY MASS INDEX DOCD: CPT | Performed by: OBSTETRICS & GYNECOLOGY

## 2023-12-13 PROCEDURE — 3079F DIAST BP 80-89 MM HG: CPT | Performed by: OBSTETRICS & GYNECOLOGY

## 2023-12-13 PROCEDURE — 3074F SYST BP LT 130 MM HG: CPT | Performed by: OBSTETRICS & GYNECOLOGY

## 2023-12-13 PROCEDURE — 99024 POSTOP FOLLOW-UP VISIT: CPT | Performed by: OBSTETRICS & GYNECOLOGY

## 2023-12-21 NOTE — TELEPHONE ENCOUNTER
Dr. Tamika Judd,      *The ACKNOWLEDGE button has been moved to the top right ribbon*    Please sign off on form if you agree to: disab due to pt's recent Sx  (place your signature on the first page only)    -From your Inbasket, Highlight the patient and click Chart   -Double click the 25/62/61 Forms Completion telephone encounter  -Nuvia Walshs down to the Media section   -Click the blue Hyperlink: Disab Dr. Tamika Judd 88/62/39  -Click Acknowledge located in the top right ribbon/menu   -Drag the mouse into the blank space of the document and a + sign will appear. Left click to   electronically sign the document.      Thank you,  [place]

## 2023-12-21 NOTE — TELEPHONE ENCOUNTER
Type of Leave: Disab  Reason for Leave:  sx abdominal myomectomy   Start date of leave:11/27/23  How much time needed?:  pending re eval 12/28  Forms Due Date:  Was Fee and Turnaround info Given?:

## 2023-12-22 NOTE — TELEPHONE ENCOUNTER
Forms Completed and faxed to [facility] at [phone].  Confirmation received MyChat sent to patient

## 2023-12-28 ENCOUNTER — TELEPHONE (OUTPATIENT)
Dept: OBGYN CLINIC | Facility: CLINIC | Age: 33
End: 2023-12-28

## 2023-12-28 ENCOUNTER — OFFICE VISIT (OUTPATIENT)
Dept: OBGYN CLINIC | Facility: CLINIC | Age: 33
End: 2023-12-28
Payer: MEDICAID

## 2023-12-28 VITALS
DIASTOLIC BLOOD PRESSURE: 72 MMHG | HEART RATE: 76 BPM | BODY MASS INDEX: 48 KG/M2 | SYSTOLIC BLOOD PRESSURE: 118 MMHG | WEIGHT: 293 LBS

## 2023-12-28 DIAGNOSIS — Z98.890 S/P MYOMECTOMY: Primary | ICD-10-CM

## 2023-12-28 DIAGNOSIS — R19.05 ABDOMINAL WALL MASS OF PERIUMBILICAL REGION: ICD-10-CM

## 2023-12-28 PROCEDURE — 99024 POSTOP FOLLOW-UP VISIT: CPT | Performed by: OBSTETRICS & GYNECOLOGY

## 2023-12-28 PROCEDURE — 3074F SYST BP LT 130 MM HG: CPT | Performed by: OBSTETRICS & GYNECOLOGY

## 2023-12-28 PROCEDURE — 3078F DIAST BP <80 MM HG: CPT | Performed by: OBSTETRICS & GYNECOLOGY

## 2024-01-02 ENCOUNTER — MED REC SCAN ONLY (OUTPATIENT)
Dept: FAMILY MEDICINE CLINIC | Facility: CLINIC | Age: 34
End: 2024-01-02

## 2024-01-02 NOTE — TELEPHONE ENCOUNTER
----- Message from Lindsey Farrell MD sent at 12/28/2023  2:38 PM CST -----  Regarding: return to work with restrictions  - may return to work with reduced hours 4-5 hours per day for two weeks starting 12/30/23 then may resume work without restrictions  - pt reports she submitted paperwork     
Letter sent to patients MyChart with restrictions provided by Dr. Farrell below.  
Please see Doctors notes after seeing the patient today.   
Pt called today LM, Returned call pt received email address to send NEW ADA under Dr. Lindsey Farrell w/ the below restrictions from details... pt will also call Dr's office to have them add a letter to chart so she can print from MyCCXOWAREt   
Pt called wants a letter for work will be returning work on 1/04/24 indicating she can work with the restrictions of her being able to work only 4 to 5 hours per day.  
Received ADA form. Valid outside auth for [employer] on file, expires 10/27/24. Logged for processing.  
Patient's belongings returned

## 2024-01-03 ENCOUNTER — NURSE ONLY (OUTPATIENT)
Dept: HEMATOLOGY/ONCOLOGY | Facility: HOSPITAL | Age: 34
End: 2024-01-03
Attending: INTERNAL MEDICINE
Payer: MEDICAID

## 2024-01-03 DIAGNOSIS — D50.0 IRON DEFICIENCY ANEMIA DUE TO CHRONIC BLOOD LOSS: ICD-10-CM

## 2024-01-03 LAB
BASOPHILS # BLD AUTO: 0.01 X10(3) UL (ref 0–0.2)
BASOPHILS NFR BLD AUTO: 0.2 %
DEPRECATED HBV CORE AB SER IA-ACNC: 41.7 NG/ML
EOSINOPHIL # BLD AUTO: 0.23 X10(3) UL (ref 0–0.7)
EOSINOPHIL NFR BLD AUTO: 3.7 %
ERYTHROCYTE [DISTWIDTH] IN BLOOD BY AUTOMATED COUNT: 14.3 %
HCT VFR BLD AUTO: 34.2 %
HGB BLD-MCNC: 11.3 G/DL
IMM GRANULOCYTES # BLD AUTO: 0.02 X10(3) UL (ref 0–1)
IMM GRANULOCYTES NFR BLD: 0.3 %
IRON SATN MFR SERPL: 12 %
IRON SERPL-MCNC: 39 UG/DL
LYMPHOCYTES # BLD AUTO: 1.82 X10(3) UL (ref 1–4)
LYMPHOCYTES NFR BLD AUTO: 29.5 %
MCH RBC QN AUTO: 27 PG (ref 26–34)
MCHC RBC AUTO-ENTMCNC: 33 G/DL (ref 31–37)
MCV RBC AUTO: 81.6 FL
MONOCYTES # BLD AUTO: 0.53 X10(3) UL (ref 0.1–1)
MONOCYTES NFR BLD AUTO: 8.6 %
NEUTROPHILS # BLD AUTO: 3.56 X10 (3) UL (ref 1.5–7.7)
NEUTROPHILS # BLD AUTO: 3.56 X10(3) UL (ref 1.5–7.7)
NEUTROPHILS NFR BLD AUTO: 57.7 %
PLATELET # BLD AUTO: 291 10(3)UL (ref 150–450)
RBC # BLD AUTO: 4.19 X10(6)UL
TIBC SERPL-MCNC: 328 UG/DL (ref 240–450)
TRANSFERRIN SERPL-MCNC: 220 MG/DL (ref 200–360)
WBC # BLD AUTO: 6.2 X10(3) UL (ref 4–11)

## 2024-01-03 PROCEDURE — 83550 IRON BINDING TEST: CPT

## 2024-01-03 PROCEDURE — 36415 COLL VENOUS BLD VENIPUNCTURE: CPT

## 2024-01-03 PROCEDURE — 82728 ASSAY OF FERRITIN: CPT

## 2024-01-03 PROCEDURE — 83540 ASSAY OF IRON: CPT

## 2024-01-03 PROCEDURE — 85025 COMPLETE CBC W/AUTO DIFF WBC: CPT

## 2024-01-09 ENCOUNTER — PATIENT MESSAGE (OUTPATIENT)
Dept: INTERNAL MEDICINE CLINIC | Facility: CLINIC | Age: 34
End: 2024-01-09

## 2024-01-10 ENCOUNTER — OFFICE VISIT (OUTPATIENT)
Dept: HEMATOLOGY/ONCOLOGY | Facility: HOSPITAL | Age: 34
End: 2024-01-10
Attending: INTERNAL MEDICINE
Payer: MEDICAID

## 2024-01-10 VITALS
OXYGEN SATURATION: 100 % | RESPIRATION RATE: 16 BRPM | SYSTOLIC BLOOD PRESSURE: 133 MMHG | TEMPERATURE: 98 F | DIASTOLIC BLOOD PRESSURE: 84 MMHG | HEART RATE: 75 BPM

## 2024-01-10 DIAGNOSIS — D50.0 IRON DEFICIENCY ANEMIA DUE TO CHRONIC BLOOD LOSS: Primary | ICD-10-CM

## 2024-01-10 PROCEDURE — 96374 THER/PROPH/DIAG INJ IV PUSH: CPT

## 2024-01-10 NOTE — TELEPHONE ENCOUNTER
From: Karyn Pickering  To: Adela Myers  Sent: 1/9/2024 9:39 PM CST  Subject: Topiramate    I’ve been taking the topiramate like you’ve instructed and it’s making my throat feel sore and a little swollen it only happens after I take the medication a last a couple of hours. Should I stop taking the medication

## 2024-01-10 NOTE — PROGRESS NOTES
Education Record    Learner:  Patient    Disease / Diagnosis: Here for Feraheme inf.     Barriers / Limitations:  None    Method:  Brief focused, printed material and  reinforcement    General Topics:  Plan of care reviewed    Outcome:  Shows understanding. Pt tolerated infusion. Left in stable condition. VSS. Appts in place for next visits.

## 2024-01-16 ENCOUNTER — TELEPHONE (OUTPATIENT)
Dept: HEMATOLOGY/ONCOLOGY | Facility: HOSPITAL | Age: 34
End: 2024-01-16

## 2024-01-17 ENCOUNTER — APPOINTMENT (OUTPATIENT)
Dept: HEMATOLOGY/ONCOLOGY | Facility: HOSPITAL | Age: 34
End: 2024-01-17
Attending: INTERNAL MEDICINE
Payer: MEDICAID

## 2024-01-17 ENCOUNTER — HOSPITAL ENCOUNTER (OUTPATIENT)
Dept: ULTRASOUND IMAGING | Age: 34
Discharge: HOME | End: 2024-01-17
Attending: OBSTETRICS & GYNECOLOGY
Payer: MEDICAID

## 2024-01-17 DIAGNOSIS — R19.05 ABDOMINAL WALL MASS OF PERIUMBILICAL REGION: ICD-10-CM

## 2024-01-17 PROCEDURE — 76705 ECHO EXAM OF ABDOMEN: CPT | Performed by: OBSTETRICS & GYNECOLOGY

## 2024-01-24 ENCOUNTER — OFFICE VISIT (OUTPATIENT)
Dept: HEMATOLOGY/ONCOLOGY | Facility: HOSPITAL | Age: 34
End: 2024-01-24
Attending: INTERNAL MEDICINE
Payer: MEDICAID

## 2024-01-24 VITALS
DIASTOLIC BLOOD PRESSURE: 75 MMHG | RESPIRATION RATE: 16 BRPM | HEART RATE: 64 BPM | OXYGEN SATURATION: 98 % | TEMPERATURE: 98 F | SYSTOLIC BLOOD PRESSURE: 117 MMHG

## 2024-01-24 DIAGNOSIS — D50.0 IRON DEFICIENCY ANEMIA DUE TO CHRONIC BLOOD LOSS: Primary | ICD-10-CM

## 2024-01-24 PROCEDURE — 96374 THER/PROPH/DIAG INJ IV PUSH: CPT

## 2024-01-24 NOTE — PROGRESS NOTES
Pt here for feraheme . Pt denies any issues or concerns.      Ordering MD: Raffaele  Order Exp: ongoing prn     Pt tolerated infusion without difficulty or complaint. Reviewed next apt date/time: labs on 3/27      Education Record  Learner:  Patient  Disease / Diagnosis: JESUS  Barriers / Limitations:  None  Method:  Discussion  General Topics:  Medication and Plan of care reviewed  Outcome:  Shows understanding      Here for second dose of feraheme for this round of iron. Labs in two months--scheduled

## 2024-02-07 ENCOUNTER — OFFICE VISIT (OUTPATIENT)
Dept: OTOLARYNGOLOGY | Facility: CLINIC | Age: 34
End: 2024-02-07
Payer: MEDICAID

## 2024-02-07 VITALS — BODY MASS INDEX: 44.41 KG/M2 | WEIGHT: 293 LBS | HEIGHT: 68 IN

## 2024-02-07 DIAGNOSIS — H60.8X3 CHRONIC ECZEMATOUS OTITIS EXTERNA OF BOTH EARS: Primary | ICD-10-CM

## 2024-02-07 DIAGNOSIS — H61.23 BILATERAL IMPACTED CERUMEN: ICD-10-CM

## 2024-02-07 PROCEDURE — 99203 OFFICE O/P NEW LOW 30 MIN: CPT | Performed by: STUDENT IN AN ORGANIZED HEALTH CARE EDUCATION/TRAINING PROGRAM

## 2024-02-07 PROCEDURE — 69210 REMOVE IMPACTED EAR WAX UNI: CPT | Performed by: STUDENT IN AN ORGANIZED HEALTH CARE EDUCATION/TRAINING PROGRAM

## 2024-02-07 RX ORDER — MOMETASONE FUROATE 1 MG/G
1 OINTMENT TOPICAL DAILY
Qty: 1 EACH | Refills: 0 | Status: SHIPPED | OUTPATIENT
Start: 2024-02-07

## 2024-02-07 RX ORDER — FLUOCINOLONE ACETONIDE 0.11 MG/ML
3 OIL AURICULAR (OTIC) DAILY
Qty: 20 ML | Refills: 0 | Status: SHIPPED | OUTPATIENT
Start: 2024-02-07 | End: 2024-02-14

## 2024-02-08 NOTE — PROGRESS NOTES
Karyn Pickering is a 33 year old female.   Chief Complaint   Patient presents with    Nose Problem     Patient reports tenderness on nose.    Ear Wax     Patient is here for bilateral ear wax cleaning.       ASSESSMENT AND PLAN:   1. Chronic eczematous otitis externa of both ears  Feels ears are clogged.  Dry and itchy ears.    Slight eczema of the ear canals near the external auditory meatus on each side  with cerumen    Could be that she has a chronic dermatitis or eczema process of her ear canal openings that is causing this itching and also what cerumen accumulation on this area.  Discussed calming this down with a mometasone ointment and Derm otic drops.  She was agreeable to plan     - Fluocinolone Acetonide 0.01 % Otic Oil; Place 3 drops in ear(s) daily for 7 days. Into affected ear  Dispense: 20 mL; Refill: 0    2. Bilateral impacted cerumen        The patient indicates understanding of these issues and agrees to the plan.      EXAM:   Ht 5' 8\" (1.727 m)   Wt (!) 312 lb (141.5 kg)   LMP 12/24/2023 (Exact Date)   BMI 47.44 kg/m²     Pertinent exam findings may also be noted above in assessment and plan     System Details   Skin Inspection - Normal.   Constitutional Overall appearance - Normal.   Head/Face Symmetric, TMJ tenderness not present    Eyes EOMI, PERRL   Right ear:  Canal clear, TM intact, no AILYN   Left ear:  Canal clear, TM intact, no AILYN   Nose: Septum midline, inferior turbinates not enlarged, nasal valves without collapse    Oral cavity/Oropharynx: No lesions or masses on inspection or palpation, tonsils symmetric    Neck: Soft without LAD, thyroid not enlarged  Voice clear/ no stridor   Other:      Scopes and Procedures:     Canals:  Left: Canal with cerumen preventing adequate view of TM, debrided with instrumentation  Right: Canal with cerumen preventing adequate view of TM, debrided with instrumentation    Tympanic Membranes:  Left: Normal tympanic membrane.   Right: Normal tympanic  membrane.     TM Visualized Method:   Left TM examined via otomicroscopy.    Right TM examined via otomicroscopy.      PROCEDURE:   Removal of cerumen impaction   The cerumen impaction was completely removed on the left and right sides using microscopy as necessary.   Removal was completed by using a curette and suction.         Current Outpatient Medications   Medication Sig Dispense Refill    mometasone 0.1 % External Ointment Apply 1 Application topically daily. 1 each 0    Fluocinolone Acetonide 0.01 % Otic Oil Place 3 drops in ear(s) daily for 7 days. Into affected ear 20 mL 0    Phentermine HCl 15 MG Oral Cap Take 1 capsule (15 mg total) by mouth every morning. 30 capsule 2    folic acid 1 MG Oral Tab Take 1 tablet (1 mg total) by mouth daily. 90 tablet 3    topiramate 25 MG Oral Tab Take 1 tablet (25 mg total) by mouth 2 (two) times daily. Refer to discharge instructions for dosing. (Patient not taking: Reported on 2/7/2024) 60 tablet 2    ibuprofen 600 MG Oral Tab Take 1 tablet (600 mg total) by mouth every 6 (six) hours as needed for Pain. (Patient not taking: Reported on 2/7/2024) 30 tablet 0    docusate sodium 100 MG Oral Cap Take 100 mg by mouth 2 (two) times daily as needed for constipation. (Patient not taking: Reported on 2/7/2024) 30 capsule 0    Naloxone HCl 4 MG/0.1ML Nasal Liquid 4 mg by Nasal route as needed. If patient remains unresponsive, repeat dose in other nostril 2-5 minutes after first dose. (Patient not taking: Reported on 2/7/2024) 1 kit 0    fluticasone propionate 50 MCG/ACT Nasal Suspension 2 sprays by Each Nare route daily. (Patient not taking: Reported on 2/7/2024) 1 each 1      Past Medical History:   Diagnosis Date    Anemia due to chronic blood loss 09/2023 9/2023 - IV iron    Heart murmur     History of myomectomy 11/27/2023    Abdominal myomectomy - large intramural uterine fibroid at fundus measuring about 11 cm and second large intramural uterine fibroid posterior lower  uterine segment measuring about 6 cm.Entry into the endometrial cavity was noted after the removal of the fundal fibroid & after the removal of the posterior fibroid. Prevena wound VAC    Hx of motion sickness     In the car    Hyperlipidemia     Morbid obesity with BMI of 45.0-49.9, adult ([facility]) 11/2023    Visual impairment     Contacts/glasses      Social History:  Social History     Socioeconomic History    Marital status: Single   Tobacco Use    Smoking status: Never    Smokeless tobacco: Never   Vaping Use    Vaping Use: Never used   Substance and Sexual Activity    Alcohol use: Yes     Comment: Occassional    Drug use: Never    Sexual activity: Yes     Partners: Male   Other Topics Concern    Caffeine Concern Yes    Exercise Yes     Comment: 2x week    Seat Belt Yes    Special Diet No    Stress Concern No    Weight Concern Yes     Social Determinants of Health     Food Insecurity: No Food Insecurity (11/27/2023)    Food Insecurity     Food Insecurity: Never true   Transportation Needs: No Transportation Needs (11/27/2023)    Transportation Needs     Lack of Transportation: No   Housing Stability: Low Risk  (11/27/2023)    Housing Stability     Housing Instability: No          Bran Mei MD  2/8/2024  10:44 AM

## 2024-02-14 ENCOUNTER — TELEMEDICINE (OUTPATIENT)
Dept: FAMILY MEDICINE CLINIC | Facility: CLINIC | Age: 34
End: 2024-02-14
Payer: MEDICAID

## 2024-02-14 DIAGNOSIS — H43.399 VITREOUS FLOATERS, UNSPECIFIED LATERALITY: ICD-10-CM

## 2024-02-14 DIAGNOSIS — L82.1 SEBORRHEIC KERATOSES: Primary | ICD-10-CM

## 2024-02-14 PROCEDURE — 99214 OFFICE O/P EST MOD 30 MIN: CPT | Performed by: FAMILY MEDICINE

## 2024-02-15 NOTE — PROGRESS NOTES
Video Visit    This visit is conducted using Telemedicine with live, interactive video and audio.    Karyn Pickering  verbally consents to a Video visit.    Patient understands and accepts financial responsibility for any deductible, co-insurance and/or co-pays associated with this service.    Duration of the service: 7:10 minutes      Summary of topics discussed:     Referral to Derm:  Pt requesting referral to Derm for a scalp issue. She has \"white spots\" along her hairline x years.  Was D'x Ketoconazole shampoo, was working at first, but no longer effective.  She states her aunts and sisters have same problem.    Referral to Ophtho:  pt has been seeing \"black spots\" fort he last 2-3 months, has been occurring every other day.  She is not sure if it is both eyes or one eye.  Sometimes it occurs with a HA.  She has no pain and no redness in her eyes. Denies any trauma to her eyes.  She goes to [facility] and has not brought this up to them, prefers to see Ophtho instead.    ROS: as stated per HPI  Physical Exam: Exam is limited due to no face to face visit today. Pt is awake and alert, does not appear to be in acute distress. Some visible white scaliness/flakiness along hairline .      Assessment/Plan:  1) Visual floaters  - referral to Ophtho (Dr. Baptiste)    2) Seborrheic Dermatosis  - referral to Derm (Dr. Varma)      No orders of the defined types were placed in this encounter.     No orders of the defined types were placed in this encounter.         Return for annual physical overdue.      Shauna Paulson MD

## 2024-02-28 ENCOUNTER — TELEPHONE (OUTPATIENT)
Dept: OPHTHALMOLOGY | Facility: CLINIC | Age: 34
End: 2024-02-28

## 2024-02-28 NOTE — TELEPHONE ENCOUNTER
Pt called stating pt is seeing floaters for the last couple months.  Pt requesting an appointment on a Wednesday.  Please call pt

## 2024-02-29 NOTE — TELEPHONE ENCOUNTER
Called patient, she has had 2 floaters for the past 2 months which move around.  No loss of vision, no flashes of light.  Explained that floaters are normal but to watch for flashes, multiple floaters, curtain over vision or loss of vision, then she should come in right away.      VERONICA.

## 2024-03-27 ENCOUNTER — NURSE ONLY (OUTPATIENT)
Dept: HEMATOLOGY/ONCOLOGY | Facility: HOSPITAL | Age: 34
End: 2024-03-27
Attending: INTERNAL MEDICINE
Payer: MEDICAID

## 2024-03-27 DIAGNOSIS — D50.0 IRON DEFICIENCY ANEMIA DUE TO CHRONIC BLOOD LOSS: ICD-10-CM

## 2024-03-27 LAB
BASOPHILS # BLD AUTO: 0.02 X10(3) UL (ref 0–0.2)
BASOPHILS NFR BLD AUTO: 0.3 %
DEPRECATED HBV CORE AB SER IA-ACNC: 35.7 NG/ML
EOSINOPHIL # BLD AUTO: 0.09 X10(3) UL (ref 0–0.7)
EOSINOPHIL NFR BLD AUTO: 1.3 %
ERYTHROCYTE [DISTWIDTH] IN BLOOD BY AUTOMATED COUNT: 14.5 %
HCT VFR BLD AUTO: 39.4 %
HGB BLD-MCNC: 12.5 G/DL
IMM GRANULOCYTES # BLD AUTO: 0.02 X10(3) UL (ref 0–1)
IMM GRANULOCYTES NFR BLD: 0.3 %
IRON SATN MFR SERPL: 15 %
IRON SERPL-MCNC: 53 UG/DL
LYMPHOCYTES # BLD AUTO: 1.7 X10(3) UL (ref 1–4)
LYMPHOCYTES NFR BLD AUTO: 25.1 %
MCH RBC QN AUTO: 26.7 PG (ref 26–34)
MCHC RBC AUTO-ENTMCNC: 31.7 G/DL (ref 31–37)
MCV RBC AUTO: 84 FL
MONOCYTES # BLD AUTO: 0.52 X10(3) UL (ref 0.1–1)
MONOCYTES NFR BLD AUTO: 7.7 %
NEUTROPHILS # BLD AUTO: 4.41 X10 (3) UL (ref 1.5–7.7)
NEUTROPHILS # BLD AUTO: 4.41 X10(3) UL (ref 1.5–7.7)
NEUTROPHILS NFR BLD AUTO: 65.3 %
PLATELET # BLD AUTO: 269 10(3)UL (ref 150–450)
RBC # BLD AUTO: 4.69 X10(6)UL
TIBC SERPL-MCNC: 356 UG/DL (ref 240–450)
TRANSFERRIN SERPL-MCNC: 239 MG/DL (ref 200–360)
WBC # BLD AUTO: 6.8 X10(3) UL (ref 4–11)

## 2024-03-27 PROCEDURE — 83540 ASSAY OF IRON: CPT

## 2024-03-27 PROCEDURE — 82728 ASSAY OF FERRITIN: CPT

## 2024-03-27 PROCEDURE — 85025 COMPLETE CBC W/AUTO DIFF WBC: CPT

## 2024-03-27 PROCEDURE — 83550 IRON BINDING TEST: CPT

## 2024-03-27 PROCEDURE — 36415 COLL VENOUS BLD VENIPUNCTURE: CPT

## 2024-03-28 ENCOUNTER — TELEPHONE (OUTPATIENT)
Dept: HEMATOLOGY/ONCOLOGY | Facility: HOSPITAL | Age: 34
End: 2024-03-28

## 2024-04-03 ENCOUNTER — APPOINTMENT (OUTPATIENT)
Dept: HEMATOLOGY/ONCOLOGY | Facility: HOSPITAL | Age: 34
End: 2024-04-03
Attending: INTERNAL MEDICINE
Payer: COMMERCIAL

## 2024-04-17 ENCOUNTER — OFFICE VISIT (OUTPATIENT)
Dept: HEMATOLOGY/ONCOLOGY | Facility: HOSPITAL | Age: 34
End: 2024-04-17
Attending: INTERNAL MEDICINE
Payer: COMMERCIAL

## 2024-04-17 VITALS
SYSTOLIC BLOOD PRESSURE: 163 MMHG | RESPIRATION RATE: 18 BRPM | TEMPERATURE: 98 F | OXYGEN SATURATION: 99 % | DIASTOLIC BLOOD PRESSURE: 97 MMHG | HEART RATE: 78 BPM

## 2024-04-17 DIAGNOSIS — D50.0 IRON DEFICIENCY ANEMIA DUE TO CHRONIC BLOOD LOSS: Primary | ICD-10-CM

## 2024-04-17 PROCEDURE — 96374 THER/PROPH/DIAG INJ IV PUSH: CPT

## 2024-04-17 NOTE — PROGRESS NOTES
Education Record    Learner:  Patient    Disease / Diagnosis: Here for Injectafer.     Barriers / Limitations:  None    Method:  Brief focused, printed material and  reinforcement    General Topics:  Plan of care reviewed    Outcome:  Shows understanding. Pt tolerated infusion well. Left in stable condition. Billing reviewed injectafer auth prior to visit. Appts in place for second dose of injectafer in 1 week and labs in 3 months.

## 2024-04-24 ENCOUNTER — OFFICE VISIT (OUTPATIENT)
Dept: HEMATOLOGY/ONCOLOGY | Facility: HOSPITAL | Age: 34
End: 2024-04-24
Attending: INTERNAL MEDICINE
Payer: COMMERCIAL

## 2024-04-24 VITALS
HEART RATE: 81 BPM | TEMPERATURE: 98 F | DIASTOLIC BLOOD PRESSURE: 86 MMHG | SYSTOLIC BLOOD PRESSURE: 148 MMHG | BODY MASS INDEX: 50 KG/M2 | WEIGHT: 293 LBS | OXYGEN SATURATION: 100 %

## 2024-04-24 DIAGNOSIS — D50.0 IRON DEFICIENCY ANEMIA DUE TO CHRONIC BLOOD LOSS: Primary | ICD-10-CM

## 2024-04-24 PROCEDURE — 96374 THER/PROPH/DIAG INJ IV PUSH: CPT

## 2024-04-24 NOTE — PROGRESS NOTES
Education Record    Learner:  Patient    Disease / Diagnosis:iron deficiency    Barriers / Limitations:  None   Comments:    Method:  Discussion   Comments:    General Topics:  Medication, Side effects and symptom management, and Plan of care reviewed   Comments:    Outcome:  Shows understanding   Comments:Tolerated infusion without incident. Patient verbalized she has her follow up lab appointment scheduled.

## 2024-05-08 ENCOUNTER — OFFICE VISIT (OUTPATIENT)
Dept: FAMILY MEDICINE CLINIC | Facility: CLINIC | Age: 34
End: 2024-05-08
Payer: COMMERCIAL

## 2024-05-08 VITALS
DIASTOLIC BLOOD PRESSURE: 80 MMHG | TEMPERATURE: 98 F | WEIGHT: 293 LBS | HEART RATE: 79 BPM | SYSTOLIC BLOOD PRESSURE: 122 MMHG | RESPIRATION RATE: 18 BRPM | BODY MASS INDEX: 50 KG/M2 | OXYGEN SATURATION: 98 %

## 2024-05-08 DIAGNOSIS — Z30.013 ENCOUNTER FOR INITIAL PRESCRIPTION OF INJECTABLE CONTRACEPTIVE: ICD-10-CM

## 2024-05-08 DIAGNOSIS — N92.0 MENORRHAGIA WITH REGULAR CYCLE: Primary | ICD-10-CM

## 2024-05-08 PROCEDURE — 3079F DIAST BP 80-89 MM HG: CPT | Performed by: FAMILY MEDICINE

## 2024-05-08 PROCEDURE — 3074F SYST BP LT 130 MM HG: CPT | Performed by: FAMILY MEDICINE

## 2024-05-08 PROCEDURE — 96372 THER/PROPH/DIAG INJ SC/IM: CPT | Performed by: FAMILY MEDICINE

## 2024-05-08 PROCEDURE — 99214 OFFICE O/P EST MOD 30 MIN: CPT | Performed by: FAMILY MEDICINE

## 2024-05-08 PROCEDURE — 81025 URINE PREGNANCY TEST: CPT | Performed by: FAMILY MEDICINE

## 2024-05-08 RX ORDER — MEDROXYPROGESTERONE ACETATE 150 MG/ML
150 INJECTION, SUSPENSION INTRAMUSCULAR ONCE
Status: COMPLETED | OUTPATIENT
Start: 2024-05-08 | End: 2024-05-08

## 2024-05-08 RX ADMIN — MEDROXYPROGESTERONE ACETATE 150 MG: 150 INJECTION, SUSPENSION INTRAMUSCULAR at 09:54:00

## 2024-05-08 NOTE — PROGRESS NOTES
CHIEF COMPLAINT:   Chief Complaint   Patient presents with    Contraception     Discuss depo           HPI:     Karyn Pickering is a 34 year old female presents for Depo Provera questions.    Wants Depo Provera:  Pt has a h/o menorrhagia and uterine fibroids s/p myomectomy in 2023 with Dr. Farrell.  She continues to have a heavy period, lasting 7 days, some clotting. Some cramping. She is interested in starting Depo provera. She does not smoke, She has no personal or Fhx of DVT/PE or clotting disorders. She is on IV iron therapy per Hematology.              HISTORY:  Past Medical History:    Anemia due to chronic blood loss    2023 - IV iron    Heart murmur    History of myomectomy    Abdominal myomectomy - large intramural uterine fibroid at fundus measuring about 11 cm and second large intramural uterine fibroid posterior lower uterine segment measuring about 6 cm.Entry into the endometrial cavity was noted after the removal of the fundal fibroid & after the removal of the posterior fibroid. Prevena wound VAC    Hx of motion sickness    In the car    Hyperlipidemia    Morbid obesity with BMI of 45.0-49.9, adult (HCC)    Obesity    Visual impairment    Contacts/glasses      Past Surgical History:   Procedure Laterality Date          Prior myomectomy  2023    Abdominal myomectomy - large intramural uterine fibroid at fundus measuring about 11 cm and second large intramural uterine fibroid posterior lower uterine segment measuring about 6 cm.Entry into the endometrial cavity was noted after the removal of the fundal fibroid & after the removal of the posterior fibroid. Prevena wound VAC. Dr. Lindsey Farrell    Tonsillectomy        Family History   Problem Relation Age of Onset    Asthma Mother     Diabetes Mother     Hypertension Mother     Diabetes Maternal Grandmother     Hypertension Maternal Grandmother     Stroke Maternal Grandmother     Hypertension Father       Social History:   Social History      Socioeconomic History    Marital status: Single   Tobacco Use    Smoking status: Never    Smokeless tobacco: Never   Vaping Use    Vaping status: Never Used   Substance and Sexual Activity    Alcohol use: Yes     Comment: Occassional    Drug use: Never    Sexual activity: Yes     Partners: Male   Other Topics Concern    Caffeine Concern No    Exercise Yes    Seat Belt Yes    Special Diet No    Stress Concern No    Weight Concern Yes     Social Determinants of Health     Food Insecurity: No Food Insecurity (11/27/2023)    Food Insecurity     Food Insecurity: Never true   Transportation Needs: No Transportation Needs (11/27/2023)    Transportation Needs     Lack of Transportation: No    [facility]    Social Connections   Housing Stability: Low Risk  (11/27/2023)    Housing Stability     Housing Instability: No        Medications (Active prior to today's visit):  Current Outpatient Medications   Medication Sig Dispense Refill    folic acid 1 MG Oral Tab Take 1 tablet (1 mg total) by mouth daily. 90 tablet 3    mometasone 0.1 % External Ointment Apply 1 Application topically daily. (Patient not taking: Reported on 5/8/2024) 1 each 0    topiramate 25 MG Oral Tab Take 1 tablet (25 mg total) by mouth 2 (two) times daily. Refer to discharge instructions for dosing. (Patient not taking: Reported on 2/7/2024) 60 tablet 2    ibuprofen 600 MG Oral Tab Take 1 tablet (600 mg total) by mouth every 6 (six) hours as needed for Pain. (Patient not taking: Reported on 2/7/2024) 30 tablet 0    docusate sodium 100 MG Oral Cap Take 100 mg by mouth 2 (two) times daily as needed for constipation. (Patient not taking: Reported on 2/7/2024) 30 capsule 0    Naloxone HCl 4 MG/0.1ML Nasal Liquid 4 mg by Nasal route as needed. If patient remains unresponsive, repeat dose in other nostril 2-5 minutes after first dose. (Patient not taking: Reported on 2/7/2024) 1 kit 0     Phentermine HCl 15 MG Oral Cap Take 1 capsule (15 mg total) by mouth every morning. (Patient not taking: Reported on 5/8/2024) 30 capsule 2    fluticasone propionate 50 MCG/ACT Nasal Suspension 2 sprays by Each Nare route daily. (Patient not taking: Reported on 2/7/2024) 1 each 1       Allergies:  No Known Allergies    PSFH elements reviewed from today and agreed except as otherwise stated in HPI.  ROS:     Review of Systems   Constitutional:  Negative for fatigue.   Gastrointestinal:  Negative for abdominal pain, nausea and vomiting.   Genitourinary:  Positive for menstrual problem and pelvic pain.   Neurological:  Negative for weakness.   Hematological:  Does not bruise/bleed easily.         Pertinent positives and negatives noted in the the HPI.    PHYSICAL EXAM:   /80 (BP Location: Left arm, Patient Position: Sitting, Cuff Size: adult)   Pulse 79   Temp 97.7 °F (36.5 °C) (Temporal)   Resp 18   Wt (!) 331 lb 3.2 oz (150.2 kg)   LMP 04/12/2024 (Exact Date)   SpO2 98%   BMI 50.36 kg/m²   Vital signs reviewed.Appears stated age, well groomed.  Physical Exam  Vitals reviewed.   Constitutional:       General: She is not in acute distress.     Appearance: Normal appearance.   HENT:      Head: Normocephalic.   Cardiovascular:      Rate and Rhythm: Normal rate and regular rhythm.      Pulses: Normal pulses.      Heart sounds: Normal heart sounds.   Pulmonary:      Effort: Pulmonary effort is normal. No respiratory distress.   Skin:     General: Skin is warm and dry.   Neurological:      Mental Status: She is alert and oriented to person, place, and time.   Psychiatric:         Behavior: Behavior normal.          LABS     Office Visit on 05/08/2024   Component Date Value    Pregnancy Test, Urine 05/08/2024 negative     Control Line Present wit* 05/08/2024 yes     Kit Lot # 05/08/2024 [id number]     Kit Expiration Date 05/08/2024 01/11/2025    Nurse Only on 03/27/2024   Component Date Value    Ferritin 03/27/2024  35.7     Iron 03/27/2024 53     Transferrin 03/27/2024 239     Total Iron Binding [place]* 03/27/2024 356     % Saturation 03/27/2024 15     WBC 03/27/2024 6.8     RBC 03/27/2024 4.69     HGB 03/27/2024 12.5     HCT 03/27/2024 39.4     PLT 03/27/2024 269.0     MCV 03/27/2024 84.0     MCH 03/27/2024 26.7     MCHC 03/27/2024 31.7     RDW 03/27/2024 14.5     Neutrophil Absolute Prel* 03/27/2024 4.41     Neutrophil Absolute 03/27/2024 4.41     Lymphocyte Absolute 03/27/2024 1.70     Monocyte Absolute 03/27/2024 0.52     Eosinophil Absolute 03/27/2024 0.09     Basophil Absolute 03/27/2024 0.02     Immature Granulocyte Abs* 03/27/2024 0.02     Neutrophil % 03/27/2024 65.3     Lymphocyte % 03/27/2024 25.1     Monocyte % 03/27/2024 7.7     Eosinophil % 03/27/2024 1.3     Basophil % 03/27/2024 0.3     Immature Granulocyte % 03/27/2024 0.3       REVIEWED THIS VISIT  ASSESSMENT/PLAN:   34 year old female with    1. Menorrhagia with regular cycle  - urine hcg: neg  - START Depo Provera, R/B/SE of new med d/w pt  - RTC in 3 months for nurse visit for next dose    - medroxyPROGESTERone (Depo-Provera) 150 MG/ML injection 150 mg    2. Encounter for initial prescription of injectable contraceptive    - medroxyPROGESTERone (Depo-Provera) 150 MG/ML injection 150 mg  - Urine Preg Test      Meds This Visit:  Requested Prescriptions      No prescriptions requested or ordered in this encounter       Health Maintenance:  Health Maintenance   Topic Date Due    DTaP,Tdap,and Td Vaccines (4 - Tdap) 05/11/2012    Annual Physical  08/22/2023    COVID-19 Vaccine (3 - 2023-24 season) 09/01/2023    Pap Smear  11/20/2023    Influenza Vaccine (Season Ended) 10/01/2024    Annual Depression Screening  Completed    Pneumococcal Vaccine: Birth to 64yrs  Aged Out         Patient/Caregiver Education: There are no barriers to learning. Medical education done.   Outcome: Patient verbalizes understanding and agrees with plan. Advised to call or RTC if symptoms  persist or worsen.    Problem List:     Patient Active Problem List   Diagnosis    Iron deficiency anemia due to chronic blood loss    Keloid    Morbid obesity with BMI of 45.0-49.9, adult (HCC)    Systolic murmur    Menorrhagia with regular cycle    Uterine leiomyoma    Seborrheic dermatitis    Hypertrophic scar    Acne    Abnormal uterine bleeding (AUB)    S/P myomectomy       Imaging & Referrals:  None     5/8/2024  Shauna Paulson MD      Patient understands plan and follow-up.  Return for annual physical overdue and RTC in 3 months for nurse visit for Depo Provera.

## 2024-06-17 ENCOUNTER — PATIENT MESSAGE (OUTPATIENT)
Dept: FAMILY MEDICINE CLINIC | Facility: CLINIC | Age: 34
End: 2024-06-17

## 2024-06-19 ENCOUNTER — APPOINTMENT (OUTPATIENT)
Dept: ULTRASOUND IMAGING | Facility: HOSPITAL | Age: 34
End: 2024-06-19
Attending: EMERGENCY MEDICINE

## 2024-06-19 ENCOUNTER — HOSPITAL ENCOUNTER (EMERGENCY)
Facility: HOSPITAL | Age: 34
Discharge: HOME | End: 2024-06-19
Attending: EMERGENCY MEDICINE

## 2024-06-19 VITALS
WEIGHT: 293 LBS | SYSTOLIC BLOOD PRESSURE: 193 MMHG | BODY MASS INDEX: 43.4 KG/M2 | RESPIRATION RATE: 18 BRPM | DIASTOLIC BLOOD PRESSURE: 81 MMHG | OXYGEN SATURATION: 100 % | HEIGHT: 69 IN | HEART RATE: 89 BPM | TEMPERATURE: 98 F

## 2024-06-19 DIAGNOSIS — N92.1 MENOMETRORRHAGIA: Primary | ICD-10-CM

## 2024-06-19 LAB
ALBUMIN SERPL-MCNC: 3.6 G/DL (ref 3.4–5)
ALBUMIN/GLOB SERPL: 0.9 {RATIO} (ref 1–2)
ALP LIVER SERPL-CCNC: 94 U/L
ALT SERPL-CCNC: 30 U/L
ANION GAP SERPL CALC-SCNC: 3 MMOL/L (ref 0–18)
AST SERPL-CCNC: 20 U/L (ref 15–37)
B-HCG UR QL: NEGATIVE
BASOPHILS # BLD AUTO: 0.02 X10(3) UL (ref 0–0.2)
BASOPHILS NFR BLD AUTO: 0.3 %
BILIRUB SERPL-MCNC: 0.5 MG/DL (ref 0.1–2)
BILIRUB UR QL STRIP.AUTO: NEGATIVE
BUN BLD-MCNC: 10 MG/DL (ref 9–23)
CALCIUM BLD-MCNC: 9.1 MG/DL (ref 8.5–10.1)
CHLORIDE SERPL-SCNC: 110 MMOL/L (ref 98–112)
CO2 SERPL-SCNC: 27 MMOL/L (ref 21–32)
COLOR UR AUTO: YELLOW
CREAT BLD-MCNC: 0.92 MG/DL
EGFRCR SERPLBLD CKD-EPI 2021: 84 ML/MIN/1.73M2 (ref 60–?)
EOSINOPHIL # BLD AUTO: 0.1 X10(3) UL (ref 0–0.7)
EOSINOPHIL NFR BLD AUTO: 1.6 %
ERYTHROCYTE [DISTWIDTH] IN BLOOD BY AUTOMATED COUNT: 14.9 %
GLOBULIN PLAS-MCNC: 3.8 G/DL (ref 2.8–4.4)
GLUCOSE BLD-MCNC: 86 MG/DL (ref 70–99)
GLUCOSE UR STRIP.AUTO-MCNC: NORMAL MG/DL
HCT VFR BLD AUTO: 41.5 %
HGB BLD-MCNC: 13.7 G/DL
IMM GRANULOCYTES # BLD AUTO: 0.02 X10(3) UL (ref 0–1)
IMM GRANULOCYTES NFR BLD: 0.3 %
KETONES UR STRIP.AUTO-MCNC: NEGATIVE MG/DL
LEUKOCYTE ESTERASE UR QL STRIP.AUTO: NEGATIVE
LYMPHOCYTES # BLD AUTO: 1.49 X10(3) UL (ref 1–4)
LYMPHOCYTES NFR BLD AUTO: 23.9 %
MCH RBC QN AUTO: 27.8 PG (ref 26–34)
MCHC RBC AUTO-ENTMCNC: 33 G/DL (ref 31–37)
MCV RBC AUTO: 84.3 FL
MONOCYTES # BLD AUTO: 0.63 X10(3) UL (ref 0.1–1)
MONOCYTES NFR BLD AUTO: 10.1 %
NEUTROPHILS # BLD AUTO: 3.98 X10 (3) UL (ref 1.5–7.7)
NEUTROPHILS # BLD AUTO: 3.98 X10(3) UL (ref 1.5–7.7)
NEUTROPHILS NFR BLD AUTO: 63.8 %
NITRITE UR QL STRIP.AUTO: NEGATIVE
OSMOLALITY SERPL CALC.SUM OF ELEC: 288 MOSM/KG (ref 275–295)
PH UR STRIP.AUTO: 7 [PH] (ref 5–8)
PLATELET # BLD AUTO: 242 10(3)UL (ref 150–450)
POTASSIUM SERPL-SCNC: 3.7 MMOL/L (ref 3.5–5.1)
PROT SERPL-MCNC: 7.4 G/DL (ref 6.4–8.2)
PROT UR STRIP.AUTO-MCNC: 30 MG/DL
RBC # BLD AUTO: 4.92 X10(6)UL
RBC #/AREA URNS AUTO: >10 /HPF
SODIUM SERPL-SCNC: 140 MMOL/L (ref 136–145)
SP GR UR STRIP.AUTO: 1.02 (ref 1–1.03)
UROBILINOGEN UR STRIP.AUTO-MCNC: 4 MG/DL
WBC # BLD AUTO: 6.2 X10(3) UL (ref 4–11)

## 2024-06-19 PROCEDURE — 85025 COMPLETE CBC W/AUTO DIFF WBC: CPT

## 2024-06-19 PROCEDURE — 93975 VASCULAR STUDY: CPT | Performed by: EMERGENCY MEDICINE

## 2024-06-19 PROCEDURE — 85025 COMPLETE CBC W/AUTO DIFF WBC: CPT | Performed by: EMERGENCY MEDICINE

## 2024-06-19 PROCEDURE — 81025 URINE PREGNANCY TEST: CPT

## 2024-06-19 PROCEDURE — 96361 HYDRATE IV INFUSION ADD-ON: CPT

## 2024-06-19 PROCEDURE — 96374 THER/PROPH/DIAG INJ IV PUSH: CPT

## 2024-06-19 PROCEDURE — 99284 EMERGENCY DEPT VISIT MOD MDM: CPT

## 2024-06-19 PROCEDURE — 76856 US EXAM PELVIC COMPLETE: CPT | Performed by: EMERGENCY MEDICINE

## 2024-06-19 PROCEDURE — 81001 URINALYSIS AUTO W/SCOPE: CPT | Performed by: EMERGENCY MEDICINE

## 2024-06-19 PROCEDURE — 80053 COMPREHEN METABOLIC PANEL: CPT | Performed by: EMERGENCY MEDICINE

## 2024-06-19 RX ORDER — KETOROLAC TROMETHAMINE 15 MG/ML
15 INJECTION, SOLUTION INTRAMUSCULAR; INTRAVENOUS ONCE
Status: COMPLETED | OUTPATIENT
Start: 2024-06-19 | End: 2024-06-19

## 2024-06-19 RX ORDER — MEDROXYPROGESTERONE ACETATE 10 MG/1
10 TABLET ORAL ONCE
Status: COMPLETED | OUTPATIENT
Start: 2024-06-19 | End: 2024-06-19

## 2024-06-19 RX ORDER — MEDROXYPROGESTERONE ACETATE 10 MG/1
10 TABLET ORAL 2 TIMES DAILY
Qty: 20 TABLET | Refills: 0 | Status: SHIPPED | OUTPATIENT
Start: 2024-06-19 | End: 2024-06-29

## 2024-06-19 NOTE — ED PROVIDER NOTES
Patient Seen in: [facility] Emergency Department      History     Chief Complaint   Patient presents with    Eval-G     Stated Complaint: vaginal bleeding for 11 days, abd pain    Subjective:   HPI    34-year-old female presents to the emergency department complaining of persistent vaginal bleeding.  The patient had uterine fibroids removed several months ago, and received a Depo-Provera injection in May 2024.  She began a menstrual period on  with some spotting which has persisted and now she is wearing both a depends and irregular pad and soaking 2 of each per day.  Over the past couple of days she has had some abdominal cramping in the lower abdomen which radiates to the back.  Past surgical history is remarkable for a laparoscopy with myomectomy.  Denies pregnancy.  No urinary complaints.    Objective:   Past Medical History:    Anemia due to chronic blood loss    2023 - IV iron    Heart murmur    History of myomectomy    Abdominal myomectomy - large intramural uterine fibroid at fundus measuring about 11 cm and second large intramural uterine fibroid posterior lower uterine segment measuring about 6 cm.Entry into the endometrial cavity was noted after the removal of the fundal fibroid & after the removal of the posterior fibroid. Prevena wound VAC    Hx of motion sickness    In the car    Hyperlipidemia    Morbid obesity with BMI of 45.0-49.9, adult (HCC)    Obesity    Visual impairment    Contacts/glasses              Past Surgical History:   Procedure Laterality Date          Prior myomectomy  2023    Abdominal myomectomy - large intramural uterine fibroid at fundus measuring about 11 cm and second large intramural uterine fibroid posterior lower uterine segment measuring about 6 cm.Entry into the endometrial cavity was noted after the removal of the fundal fibroid & after the removal of the posterior fibroid. Prevena wound VAC. Dr. Lindsey Farrell    Tonsillectomy                   Social History     Socioeconomic History    Marital status: Single   Tobacco Use    Smoking status: Never    Smokeless tobacco: Never   Vaping Use    Vaping status: Never Used   Substance and Sexual Activity    Alcohol use: Yes     Comment: Occassional    Drug use: Never    Sexual activity: Yes     Partners: Male   Other Topics Concern    Caffeine Concern No    Exercise Yes    Seat Belt Yes    Special Diet No    Stress Concern No    Weight Concern Yes     Social Determinants of Health     Food Insecurity: No Food Insecurity (11/27/2023)    Food Insecurity     Food Insecurity: Never true   Transportation Needs: No Transportation Needs (11/27/2023)    Transportation Needs     Lack of Transportation: No    [facility]    Social Connections   Housing Stability: Low Risk  (11/27/2023)    Housing Stability     Housing Instability: No              Review of Systems    Positive for stated complaint: vaginal bleeding for 11 days, abd pain  Other systems are as noted in HPI.  Constitutional and vital signs reviewed.      All other systems reviewed and negative except as noted above.    Physical Exam     ED Triage Vitals [06/19/24 1509]   BP (!) 193/81   Pulse 66   Resp 16   Temp 98.2 °F (36.8 °C)   Temp src Temporal   SpO2 98 %   O2 Device        Current Vitals:   Vital Signs  BP: (!) 193/81  Pulse: 89  Resp: 18  Temp: 98.2 °F (36.8 °C)  Temp src: Temporal    Oxygen Therapy  SpO2: 100 %            Physical Exam    General appearance: This is a young adult female sitting on a gurney.  Vital signs were reviewed per nurses notes.  HEENT: Normocephalic atraumatic.  Anicteric sclera.  Oral mucosa is moist.  Oropharynx is normal.  Neck: No adenopathy or thyromegaly.  Lungs are clear to auscultation.  Heart exam: Normal S1-S2 without extra sounds or murmurs.  Regular rate and rhythm.  Abdomen is soft and nontender without masses or rebound.  Extremities are  atraumatic.  Skin is dry without rashes or lesions.  Neuroexam: Awake and oriented x 4.  Speech is fluent.  Moving all 4 extremities well.    ED Course     Labs Reviewed   URINALYSIS, ROUTINE - Abnormal; Notable for the following components:       Result Value    Clarity Urine Turbid (*)     Blood Urine 3+ (*)     Protein Urine 30 (*)     Urobilinogen Urine 4 (*)     RBC Urine >10 (*)     Squamous Epi. Cells Few (*)     All other components within normal limits   COMP METABOLIC PANEL (14) - Abnormal; Notable for the following components:    A/G Ratio 0.9 (*)     All other components within normal limits   POCT PREGNANCY URINE - Normal   CBC WITH DIFFERENTIAL WITH PLATELET    Narrative:     The following orders were created for panel order CBC With Differential With Platelet.  Procedure                               Abnormality         Status                     ---------                               -----------         ------                     CBC W/ DIFFERENTIAL[[id number]]                              Final result                 Please view results for these tests on the individual orders.   RAINBOW DRAW LAVENDER   RAINBOW DRAW LIGHT GREEN   CBC W/ DIFFERENTIAL             Intravenous access was obtained.  Laboratory studies were drawn.    US PELVIS W DOPPLER (TZX=[id number])    Result Date: 6/19/2024  PROCEDURE:  US PELVIS W DOPPLER (BRJ=[id number])  COMPARISON:  US JADE, US PELVIS W EV (CPT=76856/14923), 6/14/2023, 1:33 PM.  INDICATIONS:  vaginal bleeding for 11 days, abd pain  TECHNIQUE:  Transabdominal imaging was performed of the pelvis.   Arterial and venous Doppler of the ovaries was also performed. PATIENT STATED HISTORY: (As transcribed by Technologist)     FINDINGS:            UTERUS:  12.1 x 5.7 x 7.1 cm.   Endometrium Thickness:  18 mm.   The uterus appears normal in size, shape, and echogenicity. RIGHT OVARY:  2.84 cm x 1.65 cm x 2.13 cm   The right ovary appears normal in size, shape, and  echogenicity. No significant masses are identified. LEFT OVARY:  3.82 cm x 1.40 cm x 2.25 cm   The left ovary appears normal in size, shape, and echogenicity. No significant masses are identified. CUL-DE-SAC:  Normal.  No fluid or mass.  OTHER:  Negative.   DOPPLER WAVE FORMS FLOW:  There is normal arterial and venous Doppler wave forms in both ovaries.  The spectral analysis is within normal limits. OTHER:  Negative.            CONCLUSION:  No acute sonographic abnormality is identified.  The endometrium is mildly thickened measuring 18 mm. Clinical correlation with the patient's menstrual cycle is suggested.   LOCATION:  [facility]    Dictated by (CST): Stromberg, LeRoy, MD on 6/19/2024 at 7:52 PM     Finalized by (CST): Stromberg, LeRoy, MD on 6/19/2024 at 7:54 PM       I personally reviewed the images myself and went over results with patient.    I viewed the pelvic ultrasound films myself and there is no abnormality noted.    Case was discussed with obstetrician on-call Dr. Jarrell who is covering for Dr. Farrell.  She recommended 10 mg of Provera twice daily for 10 days.  Office follow-up recommended.  Initial dose was provided to the patient.    Test results and treatment plan were discussed prior to discharge.         MDM      #1.  Menometrorrhagia.  Patient has been bleeding for 12 days despite Depo-Provera injection.  Not clinically unstable.  No evidence of anemia.  Not pregnant.  She has had some abdominal pain but no discrete tenderness and pelvic ultrasound was normal as well as white blood cell count.  Additional CT imaging was deferred at this time.  OB follow-up was provided.  Cautioned to use over-the-counter analgesics and return for new or worsening symptoms.                                   Medical Decision Making      Disposition and Plan     Clinical Impression:  1. Menometrorrhagia         Disposition:  Discharge  6/19/2024  8:04 pm    Follow-up:  Marry Jarrell,   [address] [facility], [facility]  [address] [place] [id number]  [phone]    Call in 1 day(s)            Medications Prescribed:  Discharge Medication List as of 6/19/2024  8:33 PM        START taking these medications    Details   medroxyPROGESTERone Acetate (PROVERA) 10 MG Oral Tab Take 1 tablet (10 mg total) by mouth in the morning and 1 tablet (10 mg total) before bedtime. Do all this for 10 days., Normal, Disp-20 tablet, R-0                                             no

## 2024-06-19 NOTE — ED INITIAL ASSESSMENT (HPI)
Patient reports vag bleeding x 11 days with small clots over the past few days with lower abdominal pain and back pain. Has had surgery for fibroids denies pregnancy

## 2024-06-19 NOTE — TELEPHONE ENCOUNTER
From: Karyn Pickering  To: Shauna Paulson  Sent: 6/17/2024 9:10 PM CDT  Subject: Follow up after depo    Hey Dr. Abernathy, I’ve been bleeding since June 8th. It starts heavy during the day and lightens up, but I’m now experiencing chills and a roaring noise in my ear. What should my next steps be?

## 2024-06-20 NOTE — DISCHARGE INSTRUCTIONS
Take ibuprofen 400 to 600 mg every 6 hours as needed for pain.    Follow-up with your obstetrician's office tomorrow for an appointment for reevaluation.    Return to the emergency department for new or worsening symptoms.

## 2024-06-21 ENCOUNTER — OFFICE VISIT (OUTPATIENT)
Dept: OBGYN CLINIC | Facility: CLINIC | Age: 34
End: 2024-06-21

## 2024-06-21 VITALS
WEIGHT: 293 LBS | HEIGHT: 69 IN | DIASTOLIC BLOOD PRESSURE: 70 MMHG | SYSTOLIC BLOOD PRESSURE: 102 MMHG | BODY MASS INDEX: 43.4 KG/M2

## 2024-06-21 DIAGNOSIS — N93.9 ABNORMAL UTERINE BLEEDING (AUB): Primary | ICD-10-CM

## 2024-06-21 DIAGNOSIS — N92.1 MENORRHAGIA WITH IRREGULAR CYCLE: ICD-10-CM

## 2024-06-21 DIAGNOSIS — Z30.430 ENCOUNTER FOR INSERTION OF INTRAUTERINE CONTRACEPTIVE DEVICE (IUD): ICD-10-CM

## 2024-06-21 PROCEDURE — 3078F DIAST BP <80 MM HG: CPT | Performed by: OBSTETRICS & GYNECOLOGY

## 2024-06-21 PROCEDURE — 3008F BODY MASS INDEX DOCD: CPT | Performed by: OBSTETRICS & GYNECOLOGY

## 2024-06-21 PROCEDURE — 3074F SYST BP LT 130 MM HG: CPT | Performed by: OBSTETRICS & GYNECOLOGY

## 2024-06-21 PROCEDURE — 99214 OFFICE O/P EST MOD 30 MIN: CPT | Performed by: OBSTETRICS & GYNECOLOGY

## 2024-06-21 PROCEDURE — 58300 INSERT INTRAUTERINE DEVICE: CPT | Performed by: OBSTETRICS & GYNECOLOGY

## 2024-06-21 NOTE — PROCEDURES
Procedure: Intrauterine device insertion - Mirena    Date of Procedure: 24    Pre-procedure diagnosis:  AUB    Post-procedure diagnosis:   AUB    Indications:   34 year old female  who presents for Intrauterine device insertion with Mirena for AUB management.     Procedure details:  The patient was counseled on various methods of AUB management. The patient requested long acting reversible contraception with Mirena Intrauterine Device. The procedure, risks, benefits and alternatives were discussed with the patient. The patient was informed of risks including but not limited to the risk of bleeding, infection, injury, improper placement, pregnancy and irregular bleeding. All questions and concerns were addressed. The patient provided verbal and written consent.     The patient was placed in supine position with legs position in stirrups. A sterile speculum was placed in the vagina and the cervix was visualized. The cervix was cleaned and prepped with betadine. Lidocaine was placed on the anterior lip of the cervix. The uterus was sounded to 11 cm. The Mirena Intrauterine Device was then prepped and loaded in sterile fashion. The Mirena Intrauterine Device was then inserted through the cervical canal into the uterine cavity, deployed and the remaining device removed. The strings were visualized at the external os and cut to 2 cm. Good hemostasis was then noted. The Mirena IUD strings were then tucked behind the cervix and the all instruments were removed from the vagina. The patient tolerated the procedure well. She was counseled on the recommendation for back up method of contraception for 7 days after placement. The patient verbalized understanding.     Disposition: Stable    Complications: None    Follow up: 4 weeks for IUD string check and well woman exam    Lindsey Farrell MD   [facility] - OBGYN    Discussed with patient that there will not be further notification of normal or benign results other than  receiving results on im3D. A im3D message or telephone call will be placed by the physician and/or office staff if results are abnormal.     Note to patient and family   The 21st Century Cures Act makes medical notes available to patients in the interest of transparency.  However, please be advised that this is a medical document.  It is intended as xiov-iy-qwet communication.  It is written and medical language may contain abbreviations or verbiage that are technical and unfamiliar.  It may appear blunt or direct.  Medical documents are intended to carry relevant information, facts as evident, and the clinical opinion of the practitioner.

## 2024-06-21 NOTE — PATIENT INSTRUCTIONS
[facility] Department of OB/GYN  After Care Instructions for IUD      Bleeding   You may experience irregular bleeding for the fist 3-6 months.  If your bleeding becomes heavier than a normal menstrual cycle, please contact our office.    Pain  You may experience mild menstrual cramping after the IUD insertion that will typically last 24-48hrs.  You may take Ibuprofen, Tylenol or Aleve to relieve the discomfort.  If you experience severe or persistent pain, please contact our office.       Restrictions    You should avoid sexual intercourse or tampon use for 1 day after insertion.  Please use a backup method of contraception for 4-7 days with the Mirena and Ara.    When to contact our office  If you are experiencing discomfort described as worse than menstrual cramps that is not relieved by ibuprofen   Fever of 100.4 or greater  Vaginal bleeding that is saturating 1 pad per hour    Any discomfort or poking sensation during intercourse or other sexual activity      Follow up in 4-6 weeks for IUD check.   If you have additional questions or concerns, please call us at [phone].

## 2024-06-24 ENCOUNTER — PATIENT MESSAGE (OUTPATIENT)
Dept: OBGYN CLINIC | Facility: CLINIC | Age: 34
End: 2024-06-24

## 2024-06-24 NOTE — TELEPHONE ENCOUNTER
From: Karyn Pickering  To: Lindsey Farrell  Sent: 6/24/2024 9:19 AM CDT  Subject: Letter for work     Hello, I was in to see Dr. Farrell on 6/21. We discussed her sending a letter to [employer] saying I was seen in her office and I was able to come back to work that day.

## 2024-07-17 ENCOUNTER — APPOINTMENT (OUTPATIENT)
Dept: HEMATOLOGY/ONCOLOGY | Facility: HOSPITAL | Age: 34
End: 2024-07-17
Attending: INTERNAL MEDICINE
Payer: COMMERCIAL

## 2024-07-18 LAB
% SATURATION: 21 % (CALC) (ref 16–45)
ABSOLUTE BASOPHILS: 10 CELLS/UL (ref 0–200)
ABSOLUTE EOSINOPHILS: 92 CELLS/UL (ref 15–500)
ABSOLUTE LYMPHOCYTES: 1346 CELLS/UL (ref 850–3900)
ABSOLUTE MONOCYTES: 388 CELLS/UL (ref 200–950)
ABSOLUTE NEUTROPHILS: 3264 CELLS/UL (ref 1500–7800)
BASOPHILS: 0.2 %
EOSINOPHILS: 1.8 %
FERRITIN: 142 NG/ML (ref 16–154)
HEMATOCRIT: 35.4 % (ref 35–45)
HEMOGLOBIN: 11.5 G/DL (ref 11.7–15.5)
IRON BINDING CAPACITY: 291 MCG/DL (CALC) (ref 250–450)
IRON, TOTAL: 62 MCG/DL (ref 40–190)
LYMPHOCYTES: 26.4 %
MCH: 27.6 PG (ref 27–33)
MCHC: 32.5 G/DL (ref 32–36)
MCV: 85.1 FL (ref 80–100)
MONOCYTES: 7.6 %
MPV: 10.1 FL (ref 7.5–12.5)
NEUTROPHILS: 64 %
PLATELET COUNT: 255 THOUSAND/UL (ref 140–400)
RDW: 15.3 % (ref 11–15)
RED BLOOD CELL COUNT: 4.16 MILLION/UL (ref 3.8–5.1)
WHITE BLOOD CELL COUNT: 5.1 THOUSAND/UL (ref 3.8–10.8)

## 2024-07-22 ENCOUNTER — PATIENT MESSAGE (OUTPATIENT)
Dept: OBGYN CLINIC | Facility: CLINIC | Age: 34
End: 2024-07-22

## 2024-07-22 DIAGNOSIS — N93.9 ABNORMAL UTERINE BLEEDING (AUB): ICD-10-CM

## 2024-07-22 DIAGNOSIS — N93.9 ABNORMAL UTERINE BLEEDING (AUB): Primary | ICD-10-CM

## 2024-07-22 NOTE — TELEPHONE ENCOUNTER
From: Karyn Pickering  To: Lindsey Farrell  Sent: 7/22/2024 3:06 AM CDT  Subject: Follow up questions on bleeding     Good morning, my iud came out and I am still bleeding. I’ve been bleeding non stop since June 8th. Is there something I can take or someone I can see to help this stop. My hemoglobin went from 13 on 6/20 to 11.5 on 6/17.

## 2024-07-22 NOTE — TELEPHONE ENCOUNTER
Called to follow up with patient.   IUD was placed on 06/21     Patient states has been bleeding since 06/08 - had IUD placed to help lighten bleeding, and it slid out at home this weekend. Still bleeding, in AM bleeding is lighter, around 1-2 PM gets heavier. Changes pad every few hours. Does endorse headaches, no other symptoms present.     Has office visit scheduled for 08/14

## 2024-07-22 NOTE — TELEPHONE ENCOUNTER
Prescription was just ordered today as:  norethindrone (AYGESTIN) 5 MG Oral Tab 60 tablet 0 7/22/2024 --    Sig - Route: Take 1 tablet (5 mg total) by mouth in the morning and 1 tablet (5 mg total) before bedtime. - Oral      Pharmacy is requesting 90 day supply.  Routed to provider for review.

## 2024-07-22 NOTE — TELEPHONE ENCOUNTER
Recommend Aygestin daily.  However, she may take Aygestin twice a day for 10 days then transition to daily due to her episode of heavier bleeding right now.  Recommend to continue daily Aygestin until she is seen in the office and we can discuss further management.  New prescription provided.  Please notify patient and provide instructions.  Thank you.

## 2024-07-31 ENCOUNTER — OFFICE VISIT (OUTPATIENT)
Dept: FAMILY MEDICINE CLINIC | Facility: CLINIC | Age: 34
End: 2024-07-31
Payer: COMMERCIAL

## 2024-07-31 VITALS
DIASTOLIC BLOOD PRESSURE: 82 MMHG | BODY MASS INDEX: 50 KG/M2 | TEMPERATURE: 97 F | RESPIRATION RATE: 18 BRPM | HEART RATE: 66 BPM | SYSTOLIC BLOOD PRESSURE: 122 MMHG | WEIGHT: 293 LBS | OXYGEN SATURATION: 97 %

## 2024-07-31 DIAGNOSIS — G44.209 TENSION HEADACHE: Primary | ICD-10-CM

## 2024-07-31 PROBLEM — E66.01 MORBID OBESITY (HCC): Status: ACTIVE | Noted: 2024-05-21

## 2024-07-31 PROBLEM — H93.13 TINNITUS OF BOTH EARS: Status: ACTIVE | Noted: 2024-07-17

## 2024-07-31 PROCEDURE — 3079F DIAST BP 80-89 MM HG: CPT | Performed by: FAMILY MEDICINE

## 2024-07-31 PROCEDURE — 96372 THER/PROPH/DIAG INJ SC/IM: CPT | Performed by: FAMILY MEDICINE

## 2024-07-31 PROCEDURE — 3074F SYST BP LT 130 MM HG: CPT | Performed by: FAMILY MEDICINE

## 2024-07-31 PROCEDURE — 99214 OFFICE O/P EST MOD 30 MIN: CPT | Performed by: FAMILY MEDICINE

## 2024-07-31 RX ORDER — POLYETHYLENE GLYCOL-3350 AND ELECTROLYTES 236; 6.74; 5.86; 2.97; 22.74 G/274.31G; G/274.31G; G/274.31G; G/274.31G; G/274.31G
POWDER, FOR SOLUTION ORAL
COMMUNITY
Start: 2024-05-30 | End: 2024-07-31

## 2024-07-31 RX ORDER — KETOROLAC TROMETHAMINE 30 MG/ML
60 INJECTION, SOLUTION INTRAMUSCULAR; INTRAVENOUS ONCE
Status: COMPLETED | OUTPATIENT
Start: 2024-07-31 | End: 2024-07-31

## 2024-07-31 RX ORDER — CYCLOBENZAPRINE HCL 10 MG
10 TABLET ORAL NIGHTLY PRN
Qty: 30 TABLET | Refills: 0 | Status: SHIPPED | OUTPATIENT
Start: 2024-07-31

## 2024-07-31 RX ORDER — TACROLIMUS 1 MG/G
OINTMENT TOPICAL
COMMUNITY
Start: 2024-06-26 | End: 2024-07-31

## 2024-07-31 RX ADMIN — KETOROLAC TROMETHAMINE 60 MG: 30 INJECTION, SOLUTION INTRAMUSCULAR; INTRAVENOUS at 10:26:00

## 2024-08-01 NOTE — PROGRESS NOTES
CHIEF COMPLAINT:   Chief Complaint   Patient presents with    Headache     Pressure from ears to back x 1 month  Headaches constant          HPI:     Karyn Pickering is a 34 year old female presents for HA.    HA: Pt has had MCGILL every other day for the past 1 month. Feels the pain in the front of her head, sometimes behind her eyes.  She had blurry vision, saw her eye doctor and was told all was normal.  Sometimes the pain is bad she has +N/V. The pain currently is a 6/10.  Also is feeling tension in her neck and shoulders. Is taking Ibuprofen, with relief for 1-2 hrs. Has a pressure in her ears, \"going down my spine and back.\" Has been getting adequate sleep and drinking plenty of water , eating regular meals.               HISTORY:  Past Medical History:    Anemia due to chronic blood loss    2023 - IV iron    Heart murmur    History of myomectomy    Abdominal myomectomy - large intramural uterine fibroid at fundus measuring about 11 cm and second large intramural uterine fibroid posterior lower uterine segment measuring about 6 cm.Entry into the endometrial cavity was noted after the removal of the fundal fibroid & after the removal of the posterior fibroid. Prevena wound VAC    Hx of motion sickness    In the car    Hyperlipidemia    Morbid obesity with BMI of 45.0-49.9, adult (HCC)    Obesity    Visual impairment    Contacts/glasses      Past Surgical History:   Procedure Laterality Date          Prior myomectomy  2023    Abdominal myomectomy - large intramural uterine fibroid at fundus measuring about 11 cm and second large intramural uterine fibroid posterior lower uterine segment measuring about 6 cm.Entry into the endometrial cavity was noted after the removal of the fundal fibroid & after the removal of the posterior fibroid. Prevena wound VAC. Dr. Lindsey Farrell    Tonsillectomy        Family History   Problem Relation Age of Onset    Asthma Mother     Diabetes Mother     Hypertension Mother      Diabetes Maternal Grandmother     Hypertension Maternal Grandmother     Stroke Maternal Grandmother     Hypertension Father       Social History:   Social History     Socioeconomic History    Marital status: Single   Tobacco Use    Smoking status: Never    Smokeless tobacco: Never   Vaping Use    Vaping status: Never Used   Substance and Sexual Activity    Alcohol use: Yes     Comment: Occassional    Drug use: Never    Sexual activity: Yes     Partners: Male     Birth control/protection: Injection   Other Topics Concern    Caffeine Concern No    Exercise Yes    Seat Belt Yes    Special Diet No    Stress Concern No    Weight Concern Yes     Social Determinants of Health     Food Insecurity: No Food Insecurity (11/27/2023)    Food Insecurity     Food Insecurity: Never true   Transportation Needs: No Transportation Needs (11/27/2023)    Transportation Needs     Lack of Transportation: No    [facility], [facility]    Social Connections   Housing Stability: Low Risk  (11/27/2023)    Housing Stability     Housing Instability: No        Medications (Active prior to today's visit):  Current Outpatient Medications   Medication Sig Dispense Refill    cyclobenzaprine 10 MG Oral Tab Take 1 tablet (10 mg total) by mouth nightly as needed for Muscle spasms. 30 tablet 0    NORETHINDRONE 5 MG Oral Tab TAKE 1 TABLET(5MG) BY MOUTH IN THE MORNING AND 1 TABLET(5MG) BY MOUTH BEFORE BEDTIME 180 tablet 0       Allergies:  No Known Allergies    PSFH elements reviewed from today and agreed except as otherwise stated in HPI.  ROS:     Review of Systems   Constitutional:  Negative for appetite change, fatigue and fever.   HENT:  Positive for ear pain. Negative for congestion.    Eyes:  Positive for visual disturbance. Negative for photophobia.   Gastrointestinal:  Positive for nausea and vomiting.   Neurological:  Positive for headaches. Negative for weakness and numbness.          Pertinent positives and negatives noted in the the HPI.    PHYSICAL EXAM:   /82 (BP Location: Right arm, Patient Position: Sitting, Cuff Size: large)   Pulse 66   Temp 97.3 °F (36.3 °C) (Temporal)   Resp 18   Wt (!) 338 lb 6.4 oz (153.5 kg)   LMP 06/08/2024 (Exact Date)   SpO2 97%   BMI 49.97 kg/m²   Vital signs reviewed.Appears stated age, well groomed.  Physical Exam  Vitals reviewed.   Constitutional:       Appearance: Normal appearance.   HENT:      Head: Normocephalic.   Cardiovascular:      Rate and Rhythm: Normal rate and regular rhythm.      Pulses: Normal pulses.      Heart sounds: Normal heart sounds.   Pulmonary:      Effort: Pulmonary effort is normal. No respiratory distress.      Breath sounds: Normal breath sounds.   Musculoskeletal:      Right lower leg: No edema.      Left lower leg: No edema.   Skin:     General: Skin is warm and dry.   Neurological:      General: No focal deficit present.      Mental Status: She is alert and oriented to person, place, and time.      Cranial Nerves: No cranial nerve deficit.      Sensory: No sensory deficit.      Motor: No weakness.      Deep Tendon Reflexes: Reflexes normal.   Psychiatric:         Behavior: Behavior normal.          LABS     Orders Only on 07/18/2024   Component Date Value    IRON, TOTAL 07/18/2024 62     IRON BINDING CAPACITY 07/18/2024 291     % SATURATION 07/18/2024 21     WHITE BLOOD CELL COUNT 07/18/2024 5.1     RED BLOOD CELL COUNT 07/18/2024 4.16     HEMOGLOBIN 07/18/2024 11.5 (L)     HEMATOCRIT 07/18/2024 35.4     MCV 07/18/2024 85.1     MCH 07/18/2024 27.6     MCHC 07/18/2024 32.5     RDW 07/18/2024 15.3 (H)     PLATELET COUNT 07/18/2024 255     MPV 07/18/2024 10.1     ABSOLUTE NEUTROPHILS 07/18/2024 3,264     ABSOLUTE LYMPHOCYTES 07/18/2024 1,346     ABSOLUTE MONOCYTES 07/18/2024 388     ABSOLUTE EOSINOPHILS 07/18/2024 92     ABSOLUTE BASOPHILS 07/18/2024 10     NEUTROPHILS 07/18/2024 64     LYMPHOCYTES 07/18/2024 26.4      MONOCYTES 07/18/2024 7.6     EOSINOPHILS 07/18/2024 1.8     BASOPHILS 07/18/2024 0.2     CLIENT EDUCATION TRACKING 07/18/2024      FERRITIN 07/18/2024 142    Admission on 06/19/2024, Discharged on 06/19/2024   Component Date Value    Hold Lavender 06/19/2024 Auto Resulted     Hold Lt Green 06/19/2024 Auto Resulted     Urine Color 06/19/2024 Yellow     Clarity Urine 06/19/2024 Turbid (A)     Spec Gravity 06/19/2024 1.019     Glucose Urine 06/19/2024 Normal     Bilirubin Urine 06/19/2024 Negative     Ketones Urine 06/19/2024 Negative     Blood Urine 06/19/2024 3+ (A)     pH Urine 06/19/2024 7.0     Protein Urine 06/19/2024 30 (A)     Urobilinogen Urine 06/19/2024 4 (A)     Nitrite Urine 06/19/2024 Negative     Leukocyte Esterase Urine 06/19/2024 Negative     WBC Urine 06/19/2024 1-5     RBC Urine 06/19/2024 >10 (A)     Bacteria Urine 06/19/2024 None Seen     Squamous Epi. Cells 06/19/2024 Few (A)     Renal Tubular Epithelial* 06/19/2024 None Seen     Transitional Cells 06/19/2024 None Seen     Yeast Urine 06/19/2024 None Seen     WBC 06/19/2024 6.2     RBC 06/19/2024 4.92     HGB 06/19/2024 13.7     HCT 06/19/2024 41.5     PLT 06/19/2024 242.0     MCV 06/19/2024 84.3     MCH 06/19/2024 27.8     MCHC 06/19/2024 33.0     RDW 06/19/2024 14.9     Neutrophil Absolute Prel* 06/19/2024 3.98     Neutrophil Absolute 06/19/2024 3.98     Lymphocyte Absolute 06/19/2024 1.49     Monocyte Absolute 06/19/2024 0.63     Eosinophil Absolute 06/19/2024 0.10     Basophil Absolute 06/19/2024 0.02     Immature Granulocyte Abs* 06/19/2024 0.02     Neutrophil % 06/19/2024 63.8     Lymphocyte % 06/19/2024 23.9     Monocyte % 06/19/2024 10.1     Eosinophil % 06/19/2024 1.6     Basophil % 06/19/2024 0.3     Immature Granulocyte % 06/19/2024 0.3     Glucose 06/19/2024 86     Sodium 06/19/2024 140     Potassium 06/19/2024 3.7     Chloride 06/19/2024 110     CO2 06/19/2024 27.0     Anion Gap 06/19/2024 3     BUN 06/19/2024 10     Creatinine  06/19/2024 0.92     Calcium, Total 06/19/2024 9.1     Calculated Osmolality 06/19/2024 288     eGFR-Cr 06/19/2024 84     AST 06/19/2024 20     ALT 06/19/2024 30     Alkaline Phosphatase 06/19/2024 94     Bilirubin, Total 06/19/2024 0.5     Total Protein 06/19/2024 7.4     Albumin 06/19/2024 3.6     Globulin  06/19/2024 3.8     A/G Ratio 06/19/2024 0.9 (L)     POCT Urine Pregnancy 06/19/2024 Negative       REVIEWED THIS VISIT  ASSESSMENT/PLAN:   34 year old female with    1. Tension headache  - suspect tension HA  - give Toradol 60 mg in office today  - START trial fo Cyclobenzaprine at bedtime prn, R/B/SE of new med d/w pt  - supportive care d/w pt  - if sx worsen or persist, advised to f-u    - cyclobenzaprine 10 MG Oral Tab; Take 1 tablet (10 mg total) by mouth nightly as needed for Muscle spasms.  Dispense: 30 tablet; Refill: 0  - ketorolac (Toradol) 60 MG/2ML IM injection 60 mg      Meds This Visit:  Requested Prescriptions     Signed Prescriptions Disp Refills    cyclobenzaprine 10 MG Oral Tab 30 tablet 0     Sig: Take 1 tablet (10 mg total) by mouth nightly as needed for Muscle spasms.       Health Maintenance:  Health Maintenance   Topic Date Due    Annual Physical  08/22/2023    Pap Smear  11/20/2023    DTaP,Tdap,and Td Vaccines (4 - Tdap) 08/31/2024 (Originally 5/11/2012)    COVID-19 Vaccine (3 - 2023-24 season) 08/31/2024 (Originally 9/1/2023)    Influenza Vaccine (1) 10/01/2024    Annual Depression Screening  Completed    Pneumococcal Vaccine: Birth to 64yrs  Aged Out         Patient/Caregiver Education: There are no barriers to learning. Medical education done.   Outcome: Patient verbalizes understanding and agrees with plan. Advised to call or RTC if symptoms persist or worsen.    Problem List:     Patient Active Problem List   Diagnosis    Iron deficiency anemia due to chronic blood loss    Keloid    Morbid obesity with BMI of 45.0-49.9, adult (HCC)    Systolic murmur    Menorrhagia with irregular  cycle    Uterine leiomyoma    Seborrheic dermatitis    Hypertrophic scar    Acne    Abnormal uterine bleeding (AUB)    S/P myomectomy    Morbid obesity (HCC)    Tinnitus of both ears       Imaging & Referrals:  None     8/1/2024  Shauna Paulson MD      Patient understands plan and follow-up.  Return for annual physical overdue.

## 2024-08-07 ENCOUNTER — TELEPHONE (OUTPATIENT)
Dept: OBGYN CLINIC | Facility: CLINIC | Age: 34
End: 2024-08-07

## 2024-08-07 NOTE — TELEPHONE ENCOUNTER
Called and spoke with pt. And told her it is ok for her to continue taking medication according to Candy Meza..  Pt. Verbalizes understanding and agrees to plan.

## 2024-08-07 NOTE — TELEPHONE ENCOUNTER
Wants to know if she should continue taking the meds that Dr ESPOSITO prescribed until her appt. Had to reschedule due to Dr ESPOSITO schedule

## 2024-08-07 NOTE — TELEPHONE ENCOUNTER
Patient see for AUB and had Mirena inserted on 06/21/24.  Per Jilliant patient states her IUD came out and Aygestin was sent on 07/22/24 in due to the bleeding. Prescription was sent in for a 90 day supply.     Spoke to patient and states bleeding has been controlled while on the Aygestin. Not currently bleeding. Patient is scheduled to be seen in the office on 09/18/24. No further questions.

## 2024-09-18 ENCOUNTER — OFFICE VISIT (OUTPATIENT)
Dept: OBGYN CLINIC | Facility: CLINIC | Age: 34
End: 2024-09-18
Payer: COMMERCIAL

## 2024-09-18 VITALS
HEART RATE: 73 BPM | DIASTOLIC BLOOD PRESSURE: 88 MMHG | SYSTOLIC BLOOD PRESSURE: 128 MMHG | WEIGHT: 293 LBS | HEIGHT: 69 IN | BODY MASS INDEX: 43.4 KG/M2

## 2024-09-18 DIAGNOSIS — Z01.419 WELL WOMAN EXAM WITH ROUTINE GYNECOLOGICAL EXAM: Primary | ICD-10-CM

## 2024-09-18 DIAGNOSIS — Z12.4 SCREENING FOR CERVICAL CANCER: ICD-10-CM

## 2024-09-18 PROCEDURE — 87624 HPV HI-RISK TYP POOLED RSLT: CPT | Performed by: OBSTETRICS & GYNECOLOGY

## 2024-09-18 PROCEDURE — 99395 PREV VISIT EST AGE 18-39: CPT | Performed by: OBSTETRICS & GYNECOLOGY

## 2024-09-18 PROCEDURE — 3079F DIAST BP 80-89 MM HG: CPT | Performed by: OBSTETRICS & GYNECOLOGY

## 2024-09-18 PROCEDURE — 3074F SYST BP LT 130 MM HG: CPT | Performed by: OBSTETRICS & GYNECOLOGY

## 2024-09-18 PROCEDURE — 88175 CYTOPATH C/V AUTO FLUID REDO: CPT | Performed by: OBSTETRICS & GYNECOLOGY

## 2024-09-18 PROCEDURE — 3008F BODY MASS INDEX DOCD: CPT | Performed by: OBSTETRICS & GYNECOLOGY

## 2024-09-18 NOTE — PROGRESS NOTES
[facility]  Obstetrics and Gynecology  History & Physical    CC: Patient presents for a well woman exam     Subjective:     HPI: Karyn Pickering is a 34 year old  female here for a well women exam. Patient reports doing well.  Hx of abdominal myomectomy on 23 2/2 AUB, uterine fibroids. 24 Mirena for AUB management but IUD fell out. Started on and then stopped Aygestin about 2 weeks ago. No further bleeding.     OB History:  OB History    Para Term  AB Living   1 1 0 1 0 1   SAB IAB Ectopic Multiple Live Births   0 0 0   1      # Outcome Date GA Lbr John/2nd Weight Sex Type Anes PTL Lv   1  03/30/15 [id number]   M NORMAL SPONT EPI Y SCARLETT       Gyne History:  Hx Prior Abnormal Pap: No  Pap Date: 20  Pap Result Notes: wnl  Patient's last menstrual period was 2024 (approximate).  NILM 2020  denies history of STDs (non-HPV).   Sexual history: Active? yes  Birth control? Condoms     Meds:  Current Outpatient Medications on File Prior to Visit   Medication Sig Dispense Refill    cyclobenzaprine 10 MG Oral Tab Take 1 tablet (10 mg total) by mouth nightly as needed for Muscle spasms. 30 tablet 0     No current facility-administered medications on file prior to visit.       All:  No Known Allergies    PMH:  Past Medical History:    Anemia due to chronic blood loss    2023 - IV iron    Heart murmur    History of myomectomy    Abdominal myomectomy - large intramural uterine fibroid at fundus measuring about 11 cm and second large intramural uterine fibroid posterior lower uterine segment measuring about 6 cm.Entry into the endometrial cavity was noted after the removal of the fundal fibroid & after the removal of the posterior fibroid. Prevena wound VAC    Hx of motion sickness    In the car    Hyperlipidemia    Morbid obesity with BMI of 45.0-49.9, adult (HCC)    Obesity    Visual impairment    Contacts/glasses       Immunization History:   Immunization History    Administered Date(s) Administered    Covid-19 Vaccine Pfizer 30 mcg/0.3 ml 2022, 2022    DTAP 1991, 1991, 1992    DTP 1991, 1991, 1992    FLUZONE 6 months and older PFS 0.5 ml (03535) 2014    Hib, Unspecified Formulation 1991, 1991    MMR 1992    OPV 1991, 1991, 1991, 1991    TD 05/10/2012   Pended Date(s) Pended    Influenza Vaccine Refused 2023       PSH:  Past Surgical History:   Procedure Laterality Date          Prior myomectomy  2023    Abdominal myomectomy - large intramural uterine fibroid at fundus measuring about 11 cm and second large intramural uterine fibroid posterior lower uterine segment measuring about 6 cm.Entry into the endometrial cavity was noted after the removal of the fundal fibroid & after the removal of the posterior fibroid. Prevena wound VAC. Dr. Lindsey Farrell    Tonsillectomy         Social History:  Social History     Socioeconomic History    Marital status: Single     Spouse name: Not on file    Number of children: Not on file    Years of education: Not on file    Highest education level: Not on file   Occupational History    Not on file   Tobacco Use    Smoking status: Never    Smokeless tobacco: Never   Vaping Use    Vaping status: Never Used   Substance and Sexual Activity    Alcohol use: Not Currently    Drug use: Never    Sexual activity: Yes     Partners: Male   Other Topics Concern    Caffeine Concern Yes    Exercise Yes    Seat Belt Yes    Special Diet No    Stress Concern No    Weight Concern Yes     Service Not Asked    Blood Transfusions Not Asked    Occupational Exposure Not Asked    Hobby Hazards Not Asked    Sleep Concern Not Asked    Back Care Not Asked    Bike Helmet Not Asked    Self-Exams Not Asked   Social History Narrative    Not on file     Social Determinants of Health     Financial Resource Strain: Not on file   Food Insecurity: No Food  Insecurity (11/27/2023)    Food Insecurity     Food Insecurity: Never true   Transportation Needs: No Transportation Needs (11/27/2023)    Transportation Needs     Lack of Transportation: No   Physical Activity: Not on file   Stress: Not on file   Social Connections: Unknown (3/15/2021)    [facility], [facility]    Social Connections     Conversations with friends/family/neighbors per week: Not on file   Housing Stability: Low Risk  (11/27/2023)    Housing Stability     Housing Instability: No     Housing Instability Emergency: Not on file     Crib or Bassinette: Not on file         Patient feels unsafe or threatened?: denies    Abuse: denies physical, sexual or mental.     Family History:  Family History   Problem Relation Age of Onset    Asthma Mother     Diabetes Mother     Hypertension Mother     Diabetes Maternal Grandmother     Hypertension Maternal Grandmother     Stroke Maternal Grandmother     Hypertension Father        Health maintenance:  Mammogram (age 40 and q1-2yr): n/a  Colonoscopy (age 45 and q10yr): n/a    Review of Systems:  General: no complaints per category. See HPI for additional information.   Breast: no complaints per category. See HPI for additional information.   Respiratory: no complaints per category. See HPI for additional information.   Cardiovascular: no complaints per category. See HPI for additional information.   GI: no complaints per category. See HPI for additional information.   : no complaints per category. See HPI for additional information.   Heme: no complaints per category. See HPI for additional information.       Objective:     Vitals:    09/18/24 1137   BP: 128/88   Pulse: 73   Weight: (!) 343 lb (155.6 kg)   Height: 69\"         Body mass index is 50.65 kg/m².    General: AAO.NAD.   CVS exam: normal peripheral perfusion  Chest: non-labored breathing, no tachypnea   Breast: symmetric, no dominant or suspicious mass,  no skin or nipple changes and no axillary adenopathy  Abdominal exam: soft, nontender, nondistended  Pelvic exam:   VULVA: normal appearing vulva with no masses, tenderness or lesions  PERINEUM:  normal appearing, no lesions   URETHRAL MEATUS:  normal appearing, no lesions   VAGINA: normal appearing vagina with normal color and discharge, no lesions  CERVIX: normal appearing cervix without discharge or lesions  UTERUS: uterus is normal size, shape, consistency and nontender  ADNEXA: normal adnexa in size, nontender and no masses  PERIRECTAL: normal appearing, no lesions   Ext: non-tender, no edema    Assessment:     Karyn Pickering is a 34 year old  female here for a well women exam.       Plan:       Problem List Items Addressed This Visit    None  Visit Diagnoses       Well woman exam with routine gynecological exam    -  Primary    Screening for cervical cancer        Relevant Orders    ThinPrep PAP with HPV Reflex Request B            Cervical cancer screening  - discussion held with the patient about ASCCP guidelines  - repeat pap smear today   Health maintenance  - encouraged to maintain weight at healthy BMI  - discussed importance of exercise and healthy eating  - self breast exam instructions provided   - AUB precautions provided   Hx of AUB  -Currently asymptomatic  -Patient desires conservative management for now declines medical management  -Discussed with patient that she may restart her Aygestin if she is noting abnormal uterine bleeding with heavy menstrual bleeding  -Discussed with patient that she may consider transitioning to Slynd for daily use  -Precautions provided    Problem List Items Addressed This Visit    None  Visit Diagnoses       Well woman exam with routine gynecological exam    -  Primary    Screening for cervical cancer        Relevant Orders    ThinPrep PAP with HPV Reflex Request B                  All of the findings and plan were discussed with the patient.  She notes  understanding and agrees with the plan of care.  All questions were answered to the best of my ability at this time.      RTC in 1 year for a well woman exam or sooner if needed     Lindsey Farrell MD   [facility] - OBGYN      Discussed with patient that there will not be further notification of normal or benign results other than receiving results on mychart. A mychart message or telephone call will be placed by the physician and/or office staff if results are abnormal.       Note to patient and family   The 21st Century Cures Act makes medical notes available to patients in the interest of transparency.  However, please be advised that this is a medical document.  It is intended as [id number] communication.  It is written and medical language may contain abbreviations or verbiage that are technical and unfamiliar.  It may appear blunt or direct.  Medical documents are intended to carry relevant information, facts as evident, and the clinical opinion of the practitioner.      This note could include assistance by Dragon voice recognition. Errors in content may be related to improper recognition by the system; efforts to review and correct have been done but errors may still exist.

## 2024-09-23 LAB
.: NORMAL
.: NORMAL
HPV E6+E7 MRNA CVX QL NAA+PROBE: NEGATIVE

## 2024-11-06 ENCOUNTER — OFFICE VISIT (OUTPATIENT)
Dept: FAMILY MEDICINE CLINIC | Facility: CLINIC | Age: 34
End: 2024-11-06
Payer: COMMERCIAL

## 2024-11-06 VITALS
RESPIRATION RATE: 18 BRPM | HEART RATE: 76 BPM | WEIGHT: 293 LBS | BODY MASS INDEX: 44.92 KG/M2 | SYSTOLIC BLOOD PRESSURE: 124 MMHG | HEIGHT: 67.91 IN | TEMPERATURE: 98 F | OXYGEN SATURATION: 97 % | DIASTOLIC BLOOD PRESSURE: 82 MMHG

## 2024-11-06 DIAGNOSIS — D50.9 IRON DEFICIENCY ANEMIA, UNSPECIFIED IRON DEFICIENCY ANEMIA TYPE: ICD-10-CM

## 2024-11-06 DIAGNOSIS — G43.809 OTHER MIGRAINE WITHOUT STATUS MIGRAINOSUS, NOT INTRACTABLE: ICD-10-CM

## 2024-11-06 DIAGNOSIS — Z13.228 SCREENING FOR ENDOCRINE, NUTRITIONAL, METABOLIC AND IMMUNITY DISORDER: ICD-10-CM

## 2024-11-06 DIAGNOSIS — Z13.0 SCREENING FOR ENDOCRINE, NUTRITIONAL, METABOLIC AND IMMUNITY DISORDER: ICD-10-CM

## 2024-11-06 DIAGNOSIS — Z13.29 SCREENING FOR ENDOCRINE, NUTRITIONAL, METABOLIC AND IMMUNITY DISORDER: ICD-10-CM

## 2024-11-06 DIAGNOSIS — R10.2 PELVIC PAIN IN FEMALE: ICD-10-CM

## 2024-11-06 DIAGNOSIS — Z23 NEED FOR VACCINATION: ICD-10-CM

## 2024-11-06 DIAGNOSIS — Z00.00 ANNUAL PHYSICAL EXAM: Primary | ICD-10-CM

## 2024-11-06 DIAGNOSIS — Z13.21 SCREENING FOR ENDOCRINE, NUTRITIONAL, METABOLIC AND IMMUNITY DISORDER: ICD-10-CM

## 2024-11-06 DIAGNOSIS — E55.9 VITAMIN D DEFICIENCY: ICD-10-CM

## 2024-11-06 LAB
ALBUMIN SERPL-MCNC: 4.4 G/DL (ref 3.2–4.8)
ALBUMIN/GLOB SERPL: 1.4 {RATIO} (ref 1–2)
ALP LIVER SERPL-CCNC: 74 U/L
ALT SERPL-CCNC: 56 U/L
ANION GAP SERPL CALC-SCNC: 5 MMOL/L (ref 0–18)
AST SERPL-CCNC: 35 U/L (ref ?–34)
BASOPHILS # BLD AUTO: 0.02 X10(3) UL (ref 0–0.2)
BASOPHILS NFR BLD AUTO: 0.3 %
BILIRUB SERPL-MCNC: 0.6 MG/DL (ref 0.3–1.2)
BUN BLD-MCNC: 7 MG/DL (ref 9–23)
CALCIUM BLD-MCNC: 9.8 MG/DL (ref 8.7–10.4)
CHLORIDE SERPL-SCNC: 109 MMOL/L (ref 98–112)
CO2 SERPL-SCNC: 25 MMOL/L (ref 21–32)
CREAT BLD-MCNC: 0.82 MG/DL
DEPRECATED HBV CORE AB SER IA-ACNC: 34 NG/ML
EGFRCR SERPLBLD CKD-EPI 2021: 97 ML/MIN/1.73M2 (ref 60–?)
EOSINOPHIL # BLD AUTO: 0.08 X10(3) UL (ref 0–0.7)
EOSINOPHIL NFR BLD AUTO: 1.4 %
ERYTHROCYTE [DISTWIDTH] IN BLOOD BY AUTOMATED COUNT: 14.6 %
FASTING STATUS PATIENT QL REPORTED: YES
GLOBULIN PLAS-MCNC: 3.2 G/DL (ref 2–3.5)
GLUCOSE BLD-MCNC: 87 MG/DL (ref 70–99)
HCT VFR BLD AUTO: 40 %
HGB BLD-MCNC: 13 G/DL
IMM GRANULOCYTES # BLD AUTO: 0 X10(3) UL (ref 0–1)
IMM GRANULOCYTES NFR BLD: 0 %
IRON SATN MFR SERPL: 11 %
IRON SERPL-MCNC: 37 UG/DL
LYMPHOCYTES # BLD AUTO: 1.37 X10(3) UL (ref 1–4)
LYMPHOCYTES NFR BLD AUTO: 23.2 %
MCH RBC QN AUTO: 26.5 PG (ref 26–34)
MCHC RBC AUTO-ENTMCNC: 32.5 G/DL (ref 31–37)
MCV RBC AUTO: 81.6 FL
MONOCYTES # BLD AUTO: 0.49 X10(3) UL (ref 0.1–1)
MONOCYTES NFR BLD AUTO: 8.3 %
NEUTROPHILS # BLD AUTO: 3.94 X10 (3) UL (ref 1.5–7.7)
NEUTROPHILS # BLD AUTO: 3.94 X10(3) UL (ref 1.5–7.7)
NEUTROPHILS NFR BLD AUTO: 66.8 %
OSMOLALITY SERPL CALC.SUM OF ELEC: 285 MOSM/KG (ref 275–295)
PLATELET # BLD AUTO: 329 10(3)UL (ref 150–450)
POTASSIUM SERPL-SCNC: 4 MMOL/L (ref 3.5–5.1)
PROT SERPL-MCNC: 7.6 G/DL (ref 5.7–8.2)
RBC # BLD AUTO: 4.9 X10(6)UL
SODIUM SERPL-SCNC: 139 MMOL/L (ref 136–145)
TOTAL IRON BINDING CAPACITY: 329 UG/DL (ref 250–425)
TRANSFERRIN SERPL-MCNC: 263 MG/DL (ref 250–380)
WBC # BLD AUTO: 5.9 X10(3) UL (ref 4–11)

## 2024-11-06 PROCEDURE — 83550 IRON BINDING TEST: CPT | Performed by: FAMILY MEDICINE

## 2024-11-06 PROCEDURE — 3008F BODY MASS INDEX DOCD: CPT | Performed by: FAMILY MEDICINE

## 2024-11-06 PROCEDURE — 85025 COMPLETE CBC W/AUTO DIFF WBC: CPT | Performed by: FAMILY MEDICINE

## 2024-11-06 PROCEDURE — 80053 COMPREHEN METABOLIC PANEL: CPT | Performed by: FAMILY MEDICINE

## 2024-11-06 PROCEDURE — 3074F SYST BP LT 130 MM HG: CPT | Performed by: FAMILY MEDICINE

## 2024-11-06 PROCEDURE — 82728 ASSAY OF FERRITIN: CPT | Performed by: FAMILY MEDICINE

## 2024-11-06 PROCEDURE — 83540 ASSAY OF IRON: CPT | Performed by: FAMILY MEDICINE

## 2024-11-06 PROCEDURE — 3079F DIAST BP 80-89 MM HG: CPT | Performed by: FAMILY MEDICINE

## 2024-11-06 PROCEDURE — 99395 PREV VISIT EST AGE 18-39: CPT | Performed by: FAMILY MEDICINE

## 2024-11-06 RX ORDER — SUMATRIPTAN 50 MG/1
50 TABLET, FILM COATED ORAL EVERY 2 HOUR PRN
Qty: 8 TABLET | Refills: 1 | Status: SHIPPED | OUTPATIENT
Start: 2024-11-06

## 2024-11-09 PROBLEM — G43.909 MIGRAINE: Status: ACTIVE | Noted: 2024-11-09

## 2024-11-09 PROBLEM — E55.9 VITAMIN D DEFICIENCY: Status: ACTIVE | Noted: 2024-11-09

## 2024-11-10 NOTE — PROGRESS NOTES
Chief Complaint   Patient presents with    Physical     Annual exam w/o pap        HPI:   Karyn Pickering is a 34 year old female who presents for a complete physical with gyne exam.   Patient feels well, dental visit up to date, no hearing problem.  Vaccinations: declines Flu    LMP: is on Progesterone OCP (no period)  Sexual activity:monogamy   Contraception: OCPs (minipill)  Exercise: none, occasional.  Diet:  healthy eating    Wt Readings from Last 3 Encounters:   11/06/24 (!) 344 lb 3.2 oz (156.1 kg)   09/18/24 (!) 343 lb (155.6 kg)   07/31/24 (!) 338 lb 6.4 oz (153.5 kg)      BP Readings from Last 3 Encounters:   11/06/24 124/82   09/18/24 128/88   07/31/24 122/82     Patient's last menstrual period was 09/09/2024 (approximate).     Annual physical:  Overall pt states she feels well.     LMP: has no period while on Progesteron OCPs  Sexually Active: monogamous  Last pap smear: 9/2024, normal pap and HPV neg (w/ Gyn Dr. Farrell)  Last mammogram: n/a  Last Colon Ca screening: n/a  Last DEXA Scan: n/a    Exercise: none  Diet: healthy    HA f-u:     Previous HPI: Pt has had MCGILL every other day for the past 1 month. Feels the pain in the front of her head, sometimes behind her eyes.  She had blurry vision, saw her eye doctor and was told all was normal.  Sometimes the pain is bad she has +N/V. The pain currently is a 6/10.  Also is feeling tension in her neck and shoulders. Is taking Ibuprofen, with relief for 1-2 hrs. Has a pressure in her ears, \"going down my spine and back.\" Has been getting adequate sleep and drinking plenty of water , eating regular meals.     Vit D Def: has been low in past, due to recheck.    JESUS f-u: pt has h/o JESUS, previously seeing Hematology for IV iron therapy from menorrhagia. Has improved some, since Gyn started her on the progesterone only OCP. Has no fatigue, no dizziness, no SOB at rest or with activity.    Previous HPI: :  Pt has a h/o menorrhagia and uterine fibroids s/p myomectomy  in 2023 with Dr. Farrell.  She continues to have a heavy period, lasting 7 days, some clotting. Some cramping. She is interested in starting Depo provera. She does not smoke, She has no personal or Fhx of DVT/PE or clotting disorders. She is on IV iron therapy per Hematology.    Pelvic pain: pt is having pelvic pain. Has never had US exam for this. She iwll see Gyn for this, but would like US ordered now.               Current Outpatient Medications   Medication Sig Dispense Refill    SUMAtriptan 50 MG Oral Tab Take 1 tablet (50 mg total) by mouth every 2 (two) hours as needed for Migraine (if symptoms persist after 2 hrs, may repeat dose x 1 (no more than 2 doses in 24 hrs)). 8 tablet 1    cyclobenzaprine 10 MG Oral Tab Take 1 tablet (10 mg total) by mouth nightly as needed for Muscle spasms. (Patient not taking: Reported on 2024) 30 tablet 0      Past Medical History:    Anemia due to chronic blood loss    2023 - IV iron    Heart murmur    History of myomectomy    Abdominal myomectomy - large intramural uterine fibroid at fundus measuring about 11 cm and second large intramural uterine fibroid posterior lower uterine segment measuring about 6 cm.Entry into the endometrial cavity was noted after the removal of the fundal fibroid & after the removal of the posterior fibroid. Prevena wound VAC    Hx of motion sickness    In the car    Hyperlipidemia    Morbid obesity with BMI of 45.0-49.9, adult (HCC)    Obesity    Visual impairment    Contacts/glasses      Past Surgical History:   Procedure Laterality Date          Prior myomectomy  2023    Abdominal myomectomy - large intramural uterine fibroid at fundus measuring about 11 cm and second large intramural uterine fibroid posterior lower uterine segment measuring about 6 cm.Entry into the endometrial cavity was noted after the removal of the fundal fibroid & after the removal of the posterior fibroid. Prevena wound VAC. Dr. Lindsey Farrell     Tonsillectomy        Family History   Problem Relation Age of Onset    Asthma Mother     Diabetes Mother     Hypertension Mother     Diabetes Maternal Grandmother     Hypertension Maternal Grandmother     Stroke Maternal Grandmother     Hypertension Father       Social History:  Social History     Socioeconomic History    Marital status: Single   Tobacco Use    Smoking status: Never    Smokeless tobacco: Never   Vaping Use    Vaping status: Never Used   Substance and Sexual Activity    Alcohol use: Not Currently    Drug use: Never    Sexual activity: Yes     Partners: Male   Other Topics Concern    Caffeine Concern Yes    Exercise Yes    Seat Belt Yes    Special Diet No    Stress Concern No    Weight Concern Yes     Social Drivers of Health     Food Insecurity: No Food Insecurity (11/27/2023)    Food Insecurity     Food Insecurity: Never true   Transportation Needs: No Transportation Needs (11/27/2023)    Transportation Needs     Lack of Transportation: No    [facility], [facility]    Social Connections   Housing Stability: Low Risk  (11/27/2023)    Housing Stability     Housing Instability: No       Allergies:  Allergies[1]     REVIEW OF SYSTEMS:     Review of Systems   Constitutional:  Negative for appetite change and fatigue.   Gastrointestinal:  Negative for abdominal pain, constipation, diarrhea, nausea and vomiting.   Genitourinary:  Positive for pelvic pain. Negative for dysuria.   Neurological:  Negative for dizziness and light-headedness.        EXAM:   /82 (BP Location: Left arm, Patient Position: Sitting, Cuff Size: large)   Pulse 76   Temp 98.2 °F (36.8 °C) (Temporal)   Resp 18   Ht 5' 7.91\" (1.725 m)   Wt (!) 344 lb 3.2 oz (156.1 kg)   LMP 09/09/2024 (Approximate)   SpO2 97%   BMI 52.47 kg/m²    GENERAL: WD/WN in no acute distress.   HEENT: PERRLA and EOMI.  OP moist no lesions.TM WNL, keiko.Normal ears canals bilaterally.  Neck is  supple, with no cervical LAD or thyroid abnormalities.  LUNGS: are clear to auscultation bilaterally, with no wheeze, rhonchi, or rales.   HEART: is RRR.  S1, S2, with no murmurs,clicks, gallops  BREAST:deferred  ABDOMEN: is soft,NBS, NT/ND with no HSM.  No rebound or guarding. No CVA tenderness, no hernias.   EXAM: deferred  RECTAL EXAM: deferred  NEURO: Cranial nerves II-XII normal,no focal abnormalities, and reflexes coordination and gait normal and symmetric.Sensation intact.  EXTREMETIES: are symmetric with no cyanosis, clubbing, or edema.  MS: Normal muscles tones, no joints abnormalities.  SKIN: Normal color, turgor, no lesions, rashes or wounds.  PSYCH: normal affect and mood.    ASSESSMENT AND PLAN:     34 year old female with     1. Annual physical exam  Routine labs ordered today, await results. Counseled pt on healthy lifestyle changes. Vaccines today: declines Flu . Contraception: on OCP    Last pap smear: 9/2024, normal pap and HPV neg (w/ Gyn Dr. Farrell)    - Comp Metabolic Panel; Future  - Comp Metabolic Panel    2. Iron deficiency anemia, unspecified iron deficiency anemia type  - improved since starting Progesterone OCPs per Gyn  - check CBC and iron studies  - asymptomatic    - CBC With Differential With Platelet; Future  - Iron And Tibc [E]; Future  - Ferritin [E]; Future  - CBC With Differential With Platelet  - Iron And Tibc [E]  - Ferritin [E]    3. Vitamin D deficiency  - due to recheck Vit D level    4. Screening for endocrine, nutritional, metabolic and immunity disorder    - CBC With Differential With Platelet; Future  - Comp Metabolic Panel; Future  - CBC With Differential With Platelet  - Comp Metabolic Panel    5. Need for vaccination    - INFLUENZA REFUSED [facility]    6. Other migraine without status migrainosus, not intractable  - having persistent HA  - no focal neuro deficits on exam  - START trial of Sumatriptan; if no relief, may try sample of Nurtec; advised to send [facility] message on  which one works better for her)    - SUMAtriptan 50 MG Oral Tab; Take 1 tablet (50 mg total) by mouth every 2 (two) hours as needed for Migraine (if symptoms persist after 2 hrs, may repeat dose x 1 (no more than 2 doses in 24 hrs)).  Dispense: 8 tablet; Refill: 1    7. Pelvic pain in female  - order US Pelvis, but pt states she will f-u with Gyn for this    - US PELVIS (TRANSABDOMINAL AND TRANSVAGINAL) (CPT=76856/55575); Future        Pt's was recommended low fat diet and aerobic exercise 30 minutes three times weekly.   Health maintenance.   Osteoporosis prevention addressed  Recommended whole food type diet, eliminate processed food/low sugar and low sat fat diet      The patient indicates understanding of these issues and agrees to the plan.    Return in about 1 year (around 11/6/2025) for annual physical.       [1] No Known Allergies

## 2024-11-11 DIAGNOSIS — E55.9 VITAMIN D DEFICIENCY: ICD-10-CM

## 2024-11-11 DIAGNOSIS — D50.9 IRON DEFICIENCY ANEMIA, UNSPECIFIED IRON DEFICIENCY ANEMIA TYPE: Primary | ICD-10-CM

## 2024-11-11 DIAGNOSIS — R79.89 ELEVATED LFTS: ICD-10-CM

## 2024-11-27 ENCOUNTER — HOSPITAL ENCOUNTER (OUTPATIENT)
Dept: ULTRASOUND IMAGING | Age: 34
Discharge: HOME | End: 2024-11-27
Attending: FAMILY MEDICINE
Payer: COMMERCIAL

## 2024-11-27 DIAGNOSIS — R10.2 PELVIC PAIN IN FEMALE: ICD-10-CM

## 2024-11-27 PROCEDURE — 76856 US EXAM PELVIC COMPLETE: CPT | Performed by: FAMILY MEDICINE

## 2024-11-27 PROCEDURE — 76830 TRANSVAGINAL US NON-OB: CPT | Performed by: FAMILY MEDICINE

## 2024-12-09 ENCOUNTER — OFFICE VISIT (OUTPATIENT)
Dept: FAMILY MEDICINE CLINIC | Facility: CLINIC | Age: 34
End: 2024-12-09
Payer: COMMERCIAL

## 2024-12-09 VITALS
SYSTOLIC BLOOD PRESSURE: 132 MMHG | HEART RATE: 62 BPM | HEIGHT: 67.91 IN | WEIGHT: 293 LBS | RESPIRATION RATE: 18 BRPM | DIASTOLIC BLOOD PRESSURE: 88 MMHG | OXYGEN SATURATION: 98 % | TEMPERATURE: 98 F | BODY MASS INDEX: 44.92 KG/M2

## 2024-12-09 DIAGNOSIS — Z01.818 PREOP EXAMINATION: Primary | ICD-10-CM

## 2024-12-09 DIAGNOSIS — E66.01 MORBID OBESITY (HCC): ICD-10-CM

## 2024-12-09 PROCEDURE — 3008F BODY MASS INDEX DOCD: CPT | Performed by: FAMILY MEDICINE

## 2024-12-09 PROCEDURE — 99213 OFFICE O/P EST LOW 20 MIN: CPT | Performed by: FAMILY MEDICINE

## 2024-12-09 PROCEDURE — G2211 COMPLEX E/M VISIT ADD ON: HCPCS | Performed by: FAMILY MEDICINE

## 2024-12-09 PROCEDURE — 3075F SYST BP GE 130 - 139MM HG: CPT | Performed by: FAMILY MEDICINE

## 2024-12-09 PROCEDURE — 3079F DIAST BP 80-89 MM HG: CPT | Performed by: FAMILY MEDICINE

## 2024-12-11 ENCOUNTER — TELEPHONE (OUTPATIENT)
Dept: FAMILY MEDICINE CLINIC | Facility: CLINIC | Age: 34
End: 2024-12-11

## 2024-12-11 NOTE — TELEPHONE ENCOUNTER
Please fax 12/9/24 pre-op H&P to her surgeon, Dr. Wood. Also please inform pt when this is done. (Surgeon ordered EKG and labs, all they needed was H&P from me).    Thanks.

## 2024-12-11 NOTE — PROGRESS NOTES
CHIEF COMPLAINT:   Chief Complaint   Patient presents with    Pre-Op Exam     Laparoscopic sleeve gastrectomy with inta op egd          HPI:     Karyn Pickering is a 34 year old female presents for pre-op physical.     Pt is here for a pre-op physical.     Procedure: Robotic Assisted Sleeve Gastrectomy With IOEGD, Possible Open    Surgery Date: 24    Location: [facility]    Surgeon: Dr. Roxann Wood     Pt has h/o previous surgeries with anesthesia without any complications. Pt does not smoke. Pt has no CP, SOB, palpitations, or dizziness at rest or with activity. She is not on any anticoagulation.                 HISTORY:  Past Medical History:    Anemia due to chronic blood loss    2023 - IV iron    Heart murmur    History of myomectomy    Abdominal myomectomy - large intramural uterine fibroid at fundus measuring about 11 cm and second large intramural uterine fibroid posterior lower uterine segment measuring about 6 cm.Entry into the endometrial cavity was noted after the removal of the fundal fibroid & after the removal of the posterior fibroid. Prevena wound VAC    Hx of motion sickness    In the car    Hyperlipidemia    Morbid obesity with BMI of 45.0-49.9, adult (HCC)    Obesity    Visual impairment    Contacts/glasses      Past Surgical History:   Procedure Laterality Date          Prior myomectomy  2023    Abdominal myomectomy - large intramural uterine fibroid at fundus measuring about 11 cm and second large intramural uterine fibroid posterior lower uterine segment measuring about 6 cm.Entry into the endometrial cavity was noted after the removal of the fundal fibroid & after the removal of the posterior fibroid. Prevena wound VAC. Dr. Lindsey Farrell    Tonsillectomy        Family History   Problem Relation Age of Onset    Asthma Mother     Diabetes Mother     Hypertension Mother     Diabetes Maternal Grandmother     Hypertension Maternal Grandmother     Stroke Maternal  Grandmother     Hypertension Father       Social History:   Social History     Socioeconomic History    Marital status: Single   Tobacco Use    Smoking status: Never    Smokeless tobacco: Never   Vaping Use    Vaping status: Never Used   Substance and Sexual Activity    Alcohol use: Not Currently    Drug use: Never    Sexual activity: Yes     Partners: Male   Other Topics Concern    Caffeine Concern Yes    Exercise Yes    Seat Belt Yes    Special Diet No    Stress Concern No    Weight Concern Yes     Social Drivers of Health     Food Insecurity: No Food Insecurity (11/27/2023)    Food Insecurity     Food Insecurity: Never true   Transportation Needs: No Transportation Needs (11/27/2023)    Transportation Needs     Lack of Transportation: No    [facility]    Social Connections   Housing Stability: Low Risk  (11/27/2023)    Housing Stability     Housing Instability: No        Medications (Active prior to today's visit):  Current Outpatient Medications   Medication Sig Dispense Refill    SUMAtriptan 50 MG Oral Tab Take 1 tablet (50 mg total) by mouth every 2 (two) hours as needed for Migraine (if symptoms persist after 2 hrs, may repeat dose x 1 (no more than 2 doses in 24 hrs)). (Patient not taking: Reported on 12/9/2024) 8 tablet 1    cyclobenzaprine 10 MG Oral Tab Take 1 tablet (10 mg total) by mouth nightly as needed for Muscle spasms. (Patient not taking: Reported on 12/9/2024) 30 tablet 0       Allergies:  Allergies[1]    [facility] and agreed except as otherwise stated in HPI.  ROS:     Review of Systems   Constitutional:  Negative for activity change, appetite change, chills, fatigue and fever.   Respiratory:  Negative for cough and shortness of breath.    Cardiovascular:  Negative for chest pain, palpitations and leg swelling.   Gastrointestinal:  Negative for abdominal pain, constipation, diarrhea, nausea and vomiting.    Genitourinary:  Negative for dysuria.         Pertinent positives and negatives noted in the the HPI.    PHYSICAL EXAM:   /88 (BP Location: Left arm, Patient Position: Sitting, Cuff Size: adult)   Pulse 62   Temp 97.6 °F (36.4 °C) (Temporal)   Resp 18   Ht 5' 7.91\" (1.725 m)   Wt (!) 344 lb 6.4 oz (156.2 kg)   LMP 09/09/2024 (Approximate)   SpO2 98%   BMI 52.50 kg/m²   Vital signs reviewed.Appears stated age, well groomed.  Physical Exam  Vitals reviewed.   Constitutional:       General: She is not in acute distress.     Appearance: Normal appearance. She is obese.   HENT:      Head: Normocephalic.      Right Ear: Tympanic membrane, ear canal and external ear normal.      Left Ear: Tympanic membrane, ear canal and external ear normal.      Nose: Nose normal. No congestion or rhinorrhea.      Mouth/Throat:      Mouth: Mucous membranes are moist.      Pharynx: Oropharynx is clear. No oropharyngeal exudate or posterior oropharyngeal erythema.   Eyes:      Conjunctiva/sclera: Conjunctivae normal.      Pupils: Pupils are equal, round, and reactive to light.   Cardiovascular:      Rate and Rhythm: Normal rate and regular rhythm.      Pulses: Normal pulses.      Heart sounds: Normal heart sounds.   Pulmonary:      Effort: Pulmonary effort is normal. No respiratory distress.      Breath sounds: Normal breath sounds.   Abdominal:      General: Bowel sounds are normal. There is no distension.      Palpations: Abdomen is soft.      Tenderness: There is no abdominal tenderness.   Musculoskeletal:      Cervical back: Normal range of motion and neck supple.      Right lower leg: No edema.      Left lower leg: No edema.   Lymphadenopathy:      Cervical: No cervical adenopathy.   Skin:     General: Skin is warm and dry.   Neurological:      General: No focal deficit present.      Mental Status: She is alert and oriented to person, place, and time.   Psychiatric:         Mood and Affect: Mood normal.          Behavior: Behavior normal.          LABS     Office Visit on 11/06/2024   Component Date Value    WBC 11/06/2024 5.9     RBC 11/06/2024 4.90     HGB 11/06/2024 13.0     HCT 11/06/2024 40.0     PLT 11/06/2024 329.0     MCV 11/06/2024 81.6     MCH 11/06/2024 26.5     MCHC 11/06/2024 32.5     RDW 11/06/2024 14.6     Neutrophil Absolute Prel* 11/06/2024 3.94     Neutrophil Absolute 11/06/2024 3.94     Lymphocyte Absolute 11/06/2024 1.37     Monocyte Absolute 11/06/2024 0.49     Eosinophil Absolute 11/06/2024 0.08     Basophil Absolute 11/06/2024 0.02     Immature Granulocyte Abs* 11/06/2024 0.00     Neutrophil % 11/06/2024 66.8     Lymphocyte % 11/06/2024 23.2     Monocyte % 11/06/2024 8.3     Eosinophil % 11/06/2024 1.4     Basophil % 11/06/2024 0.3     Immature Granulocyte % 11/06/2024 0.0     Glucose 11/06/2024 87     Sodium 11/06/2024 139     Potassium 11/06/2024 4.0     Chloride 11/06/2024 109     CO2 11/06/2024 25.0     Anion Gap 11/06/2024 5     BUN 11/06/2024 7 (L)     Creatinine 11/06/2024 0.82     Calcium, Total 11/06/2024 9.8     Calculated Osmolality 11/06/2024 285     eGFR-Cr 11/06/2024 97     AST 11/06/2024 35 (H)     ALT 11/06/2024 56 (H)     Alkaline Phosphatase 11/06/2024 74     Bilirubin, Total 11/06/2024 0.6     Total Protein 11/06/2024 7.6     Albumin 11/06/2024 4.4     Globulin  11/06/2024 3.2     A/G Ratio 11/06/2024 1.4     Patient Fasting for CMP? 11/06/2024 Yes     Iron 11/06/2024 37 (L)     Transferrin 11/06/2024 263     Total Iron Binding [place]* 11/06/2024 329     % Saturation 11/06/2024 11 (L)     Ferritin 11/06/2024 34 (L)       REVIEWED THIS VISIT  ASSESSMENT/PLAN:   34 year old female with    1. Preop examination  - pt is low-risk for upcoming surgical procedure and is medically cleared to proceed with Robotic Assisted Sleeve Gastrectomy With IOEGD, Possible Open  with Dr. Wood as scheduled on 12/30/24  - EKG ordered per surgeon, was NSR and without acute St changes in 7/2024  - labs  ordered per surgeon      2. Morbid obesity (HCC)  See above.     The patient and provider have a longitudinal relationship to address/treat the serious or   complex condition(s) as stated in this encounter.     Meds This Visit:  Requested Prescriptions      No prescriptions requested or ordered in this encounter       Health Maintenance:  Health Maintenance   Topic Date Due    DTaP,Tdap,and Td Vaccines (4 - Tdap) 05/11/2012    COVID-19 Vaccine (3 - 2024-25 season) 09/01/2024    Influenza Vaccine (1) 06/30/2025 (Originally 10/1/2024)    Annual Physical  11/06/2025    Pap Smear  09/18/2027    Annual Depression Screening  Completed    Pneumococcal Vaccine: Birth to 64yrs  Aged Out         Patient/Caregiver Education: There are no barriers to learning. Medical education done.   Outcome: Patient verbalizes understanding and agrees with plan. Advised to call or RTC if symptoms persist or worsen.    Problem List:     Patient Active Problem List   Diagnosis    Iron deficiency anemia    Keloid    Morbid obesity with BMI of 45.0-49.9, adult (HCC)    Systolic murmur    Menorrhagia with irregular cycle    Uterine leiomyoma    Seborrheic dermatitis    Hypertrophic scar    Acne    Abnormal uterine bleeding (AUB)    S/P myomectomy    Morbid obesity (HCC)    Tinnitus of both ears    Vitamin D deficiency    Migraine       Imaging & Referrals:  None     12/11/2024  Shauna Paulson MD      Patient understands plan and follow-up.  Return for annual physical due in November 2025.              [1] No Known Allergies

## 2024-12-26 ENCOUNTER — PATIENT MESSAGE (OUTPATIENT)
Dept: OBGYN CLINIC | Facility: CLINIC | Age: 34
End: 2024-12-26

## 2024-12-26 ENCOUNTER — TELEPHONE (OUTPATIENT)
Dept: OBGYN CLINIC | Facility: CLINIC | Age: 34
End: 2024-12-26

## 2024-12-26 NOTE — TELEPHONE ENCOUNTER
I spoke with pt, & informed her of below message from Dr. Farrell & offered her appt 12/27 with Dr. Charles. Pt declined, she decided to cancel her surgery & keep her appt with Dr. Farrell on 1/13.

## 2024-12-26 NOTE — TELEPHONE ENCOUNTER
I spoke with pt, & informed her of message from Dr. Farrell & offered her appt 12/27 with Dr. Charles. Pt declined, she decided to cancel her surgery & keep her appt with Dr. Farrell on 1/13.

## 2024-12-26 NOTE — TELEPHONE ENCOUNTER
Lindsey Farrell MD Psyhogios, Joanna; [facility] [place] Nurse3 hours ago (9:19 AM)       Please forward these messages to RN pool in order to triage patient concerns.    I am on call today and will not be back in office till next week. You can offer patient acute visit with any provider in office if needed.

## 2024-12-26 NOTE — TELEPHONE ENCOUNTER
Patient scheduled for bariatric surgery 12/30. Seeking your insight as to if she should postpone surgery as she has been bleeding since 12/11 and she is still taking norethindrone. Notes some days are a light flow others a little heavy but not soaking through pads. LOV 9/18/24

## 2025-01-24 ENCOUNTER — HOSPITAL ENCOUNTER (EMERGENCY)
Facility: HOSPITAL | Age: 35
Discharge: HOME | End: 2025-01-24
Attending: EMERGENCY MEDICINE
Payer: COMMERCIAL

## 2025-01-24 ENCOUNTER — APPOINTMENT (OUTPATIENT)
Dept: MRI IMAGING | Facility: HOSPITAL | Age: 35
End: 2025-01-24
Attending: EMERGENCY MEDICINE
Payer: COMMERCIAL

## 2025-01-24 ENCOUNTER — TELEMEDICINE (OUTPATIENT)
Dept: FAMILY MEDICINE CLINIC | Facility: CLINIC | Age: 35
End: 2025-01-24
Payer: COMMERCIAL

## 2025-01-24 VITALS
HEIGHT: 68 IN | TEMPERATURE: 97 F | OXYGEN SATURATION: 100 % | DIASTOLIC BLOOD PRESSURE: 96 MMHG | SYSTOLIC BLOOD PRESSURE: 145 MMHG | HEART RATE: 61 BPM | BODY MASS INDEX: 44.41 KG/M2 | WEIGHT: 293 LBS | RESPIRATION RATE: 15 BRPM

## 2025-01-24 DIAGNOSIS — R00.2 PALPITATIONS: ICD-10-CM

## 2025-01-24 DIAGNOSIS — R00.2 PALPITATIONS: Primary | ICD-10-CM

## 2025-01-24 DIAGNOSIS — R42 VERTIGO: ICD-10-CM

## 2025-01-24 DIAGNOSIS — R79.89 ELEVATED LFTS: Primary | ICD-10-CM

## 2025-01-24 DIAGNOSIS — R42 DIZZINESS: ICD-10-CM

## 2025-01-24 LAB
ALBUMIN SERPL-MCNC: 4.6 G/DL (ref 3.2–4.8)
ALBUMIN/GLOB SERPL: 1.4 {RATIO} (ref 1–2)
ALP LIVER SERPL-CCNC: 76 U/L
ALT SERPL-CCNC: 129 U/L
ANION GAP SERPL CALC-SCNC: 9 MMOL/L (ref 0–18)
AST SERPL-CCNC: 65 U/L (ref ?–34)
ATRIAL RATE: 72 BPM
BASOPHILS # BLD AUTO: 0.01 X10(3) UL (ref 0–0.2)
BASOPHILS NFR BLD AUTO: 0.1 %
BILIRUB SERPL-MCNC: 0.4 MG/DL (ref 0.3–1.2)
BUN BLD-MCNC: 10 MG/DL (ref 9–23)
CALCIUM BLD-MCNC: 9.6 MG/DL (ref 8.7–10.6)
CHLORIDE SERPL-SCNC: 105 MMOL/L (ref 98–112)
CO2 SERPL-SCNC: 24 MMOL/L (ref 21–32)
CREAT BLD-MCNC: 0.81 MG/DL
EGFRCR SERPLBLD CKD-EPI 2021: 98 ML/MIN/1.73M2 (ref 60–?)
EOSINOPHIL # BLD AUTO: 0.09 X10(3) UL (ref 0–0.7)
EOSINOPHIL NFR BLD AUTO: 1.2 %
ERYTHROCYTE [DISTWIDTH] IN BLOOD BY AUTOMATED COUNT: 16.5 %
GLOBULIN PLAS-MCNC: 3.4 G/DL (ref 2–3.5)
GLUCOSE BLD-MCNC: 111 MG/DL (ref 70–99)
HCT VFR BLD AUTO: 36.9 %
HGB BLD-MCNC: 12.2 G/DL
IMM GRANULOCYTES # BLD AUTO: 0.02 X10(3) UL (ref 0–1)
IMM GRANULOCYTES NFR BLD: 0.3 %
LYMPHOCYTES # BLD AUTO: 1.54 X10(3) UL (ref 1–4)
LYMPHOCYTES NFR BLD AUTO: 20.6 %
MCH RBC QN AUTO: 25.8 PG (ref 26–34)
MCHC RBC AUTO-ENTMCNC: 33.1 G/DL (ref 31–37)
MCV RBC AUTO: 78.2 FL
MONOCYTES # BLD AUTO: 0.54 X10(3) UL (ref 0.1–1)
MONOCYTES NFR BLD AUTO: 7.2 %
NEUTROPHILS # BLD AUTO: 5.28 X10 (3) UL (ref 1.5–7.7)
NEUTROPHILS # BLD AUTO: 5.28 X10(3) UL (ref 1.5–7.7)
NEUTROPHILS NFR BLD AUTO: 70.6 %
OSMOLALITY SERPL CALC.SUM OF ELEC: 286 MOSM/KG (ref 275–295)
P AXIS: 62 DEGREES
P-R INTERVAL: 158 MS
PLATELET # BLD AUTO: 304 10(3)UL (ref 150–450)
POTASSIUM SERPL-SCNC: 4.3 MMOL/L (ref 3.5–5.1)
PROT SERPL-MCNC: 8 G/DL (ref 5.7–8.2)
Q-T INTERVAL: 394 MS
QRS DURATION: 86 MS
QTC CALCULATION (BEZET): 431 MS
R AXIS: -4 DEGREES
RBC # BLD AUTO: 4.72 X10(6)UL
SODIUM SERPL-SCNC: 138 MMOL/L (ref 136–145)
T AXIS: 36 DEGREES
TROPONIN I SERPL HS-MCNC: <3 NG/L
VENTRICULAR RATE: 72 BPM
WBC # BLD AUTO: 7.5 X10(3) UL (ref 4–11)

## 2025-01-24 PROCEDURE — 93005 ELECTROCARDIOGRAM TRACING: CPT

## 2025-01-24 PROCEDURE — 93010 ELECTROCARDIOGRAM REPORT: CPT

## 2025-01-24 PROCEDURE — 84484 ASSAY OF TROPONIN QUANT: CPT | Performed by: EMERGENCY MEDICINE

## 2025-01-24 PROCEDURE — 36415 COLL VENOUS BLD VENIPUNCTURE: CPT

## 2025-01-24 PROCEDURE — 85025 COMPLETE CBC W/AUTO DIFF WBC: CPT | Performed by: EMERGENCY MEDICINE

## 2025-01-24 PROCEDURE — 99284 EMERGENCY DEPT VISIT MOD MDM: CPT

## 2025-01-24 PROCEDURE — 70549 MR ANGIOGRAPH NECK W/O&W/DYE: CPT | Performed by: EMERGENCY MEDICINE

## 2025-01-24 PROCEDURE — 80053 COMPREHEN METABOLIC PANEL: CPT | Performed by: EMERGENCY MEDICINE

## 2025-01-24 PROCEDURE — 98006 SYNCH AUDIO-VIDEO EST MOD 30: CPT | Performed by: FAMILY MEDICINE

## 2025-01-24 PROCEDURE — 99285 EMERGENCY DEPT VISIT HI MDM: CPT

## 2025-01-24 PROCEDURE — 70553 MRI BRAIN STEM W/O & W/DYE: CPT | Performed by: EMERGENCY MEDICINE

## 2025-01-24 PROCEDURE — A9575 INJ GADOTERATE MEGLUMI 0.1ML: HCPCS | Performed by: EMERGENCY MEDICINE

## 2025-01-24 PROCEDURE — 70546 MR ANGIOGRAPH HEAD W/O&W/DYE: CPT | Performed by: EMERGENCY MEDICINE

## 2025-01-24 RX ORDER — TIRZEPATIDE 2.5 MG/.5ML
2.5 INJECTION, SOLUTION SUBCUTANEOUS WEEKLY
COMMUNITY
Start: 2024-12-18

## 2025-01-24 RX ORDER — ERGOCALCIFEROL 1.25 MG/1
CAPSULE, LIQUID FILLED ORAL
COMMUNITY
Start: 2024-10-30

## 2025-01-24 RX ORDER — NORETHINDRONE 5 MG/1
10 TABLET ORAL DAILY
COMMUNITY
Start: 2024-07-23 | End: 2025-03-12

## 2025-01-24 RX ORDER — GARLIC EXTRACT 500 MG
1 CAPSULE ORAL DAILY
COMMUNITY

## 2025-01-24 RX ORDER — CEPHALEXIN 500 MG/1
CAPSULE ORAL
COMMUNITY
Start: 2024-10-18 | End: 2025-01-24 | Stop reason: ALTCHOICE

## 2025-01-24 RX ORDER — ACETAMINOPHEN AND CODEINE PHOSPHATE 300; 30 MG/1; MG/1
1 TABLET ORAL EVERY 6 HOURS PRN
COMMUNITY
Start: 2024-10-18

## 2025-01-24 RX ORDER — MECLIZINE HYDROCHLORIDE 25 MG/1
25 TABLET ORAL ONCE
Status: DISCONTINUED | OUTPATIENT
Start: 2025-01-24 | End: 2025-01-24

## 2025-01-24 RX ORDER — FLUOCINOLONE ACETONIDE 0.11 MG/ML
OIL TOPICAL
COMMUNITY
Start: 2024-10-09

## 2025-01-24 RX ORDER — HYDROCORTISONE 25 MG/G
CREAM TOPICAL
COMMUNITY
Start: 2024-10-09

## 2025-01-24 RX ORDER — DIAZEPAM 2 MG/1
2 TABLET ORAL 3 TIMES DAILY PRN
Qty: 15 TABLET | Refills: 0 | Status: SHIPPED | OUTPATIENT
Start: 2025-01-24 | End: 2025-01-29

## 2025-01-24 RX ORDER — GADOTERATE MEGLUMINE 376.9 MG/ML
20 INJECTION INTRAVENOUS
Status: COMPLETED | OUTPATIENT
Start: 2025-01-24 | End: 2025-01-24

## 2025-01-24 RX ORDER — ERGOCALCIFEROL 1.25 MG/1
CAPSULE ORAL
COMMUNITY

## 2025-01-24 RX ORDER — MECLIZINE HYDROCHLORIDE 25 MG/1
25 TABLET ORAL ONCE
Status: COMPLETED | OUTPATIENT
Start: 2025-01-24 | End: 2025-01-24

## 2025-01-24 RX ORDER — MECLIZINE HYDROCHLORIDE 25 MG/1
25 TABLET ORAL 3 TIMES DAILY PRN
Qty: 20 TABLET | Refills: 0 | Status: SHIPPED | OUTPATIENT
Start: 2025-01-24

## 2025-01-24 NOTE — ED INITIAL ASSESSMENT (HPI)
Pt arrives ambulatory with c/o dizziness that began around  tonight. Pt also reports a \"fluttering\" feeling in her heart. Denies pain. States she has a heart murmur but has never been symptomatic.    today

## 2025-01-24 NOTE — ED PROVIDER NOTES
Patient Seen in: [facility] Emergency Department      History     Chief Complaint   Patient presents with    Dizziness    Arrythmia/Palpitations     Stated Complaint: dizziness and palpitations starting around 0030    Subjective:   HPI      Patient is a 34-year-old female presents to ED for evaluation of palpitations and vertigo.  Patient states yesterday morning she had an episode of palpitations lasting for about 5 minutes where her heart felt like it was skipping and racing.  She states she has had 2 or 3 episodes tonight each lasting for about 5 minutes.  No chest pain or shortness of breath.  Patient lanes of continuous vertigo since around 1230 this morning with disequilibrium.  Denies head or neck pain.  No recent chiropractic adjustments.  No fever, chills or cough.  No nausea or vomiting.  No hearing loss or tinnitus.  Denies history of cardiac arrhythmia.    Objective:     Past Medical History:    Anemia due to chronic blood loss    2023 - IV iron    Heart murmur    History of myomectomy    Abdominal myomectomy - large intramural uterine fibroid at fundus measuring about 11 cm and second large intramural uterine fibroid posterior lower uterine segment measuring about 6 cm.Entry into the endometrial cavity was noted after the removal of the fundal fibroid & after the removal of the posterior fibroid. Prevena wound VAC    Hx of motion sickness    In the car    Hyperlipidemia    Morbid obesity with BMI of 45.0-49.9, adult (HCC)    Obesity    Visual impairment    Contacts/glasses              Past Surgical History:   Procedure Laterality Date          Prior myomectomy  2023    Abdominal myomectomy - large intramural uterine fibroid at fundus measuring about 11 cm and second large intramural uterine fibroid posterior lower uterine segment measuring about 6 cm.Entry into the endometrial cavity was noted after the removal of the fundal fibroid & after the removal of the posterior fibroid.  Prevena wound VAC. Dr. Lindsey Farrell    Tonsillectomy                  Social History     Socioeconomic History    Marital status: Single   Tobacco Use    Smoking status: Never    Smokeless tobacco: Never   Vaping Use    Vaping status: Never Used   Substance and Sexual Activity    Alcohol use: Not Currently     Comment: ocassionally    Drug use: Never    Sexual activity: Yes     Partners: Male   Other Topics Concern    Caffeine Concern Yes    Exercise Yes    Seat Belt Yes    Special Diet No    Stress Concern No    Weight Concern Yes     Social Drivers of Health     Food Insecurity: No Food Insecurity (11/27/2023)    Food Insecurity     Food Insecurity: Never true   Transportation Needs: No Transportation Needs (11/27/2023)    Transportation Needs     Lack of Transportation: No    [facility]    Social Connections   Housing Stability: Low Risk  (11/27/2023)    Housing Stability     Housing Instability: No                  Physical Exam     ED Triage Vitals [01/24/25 0333]   /86   Pulse 73   Resp 15   Temp 97.4 °F (36.3 °C)   Temp src    SpO2 100 %   O2 Device None (Room air)       Current Vitals:   Vital Signs  BP: 151/86  Pulse: 73  Resp: 15  Temp: 97.4 °F (36.3 °C)    Oxygen Therapy  SpO2: 100 %  O2 Device: None (Room air)        Physical Exam  GENERAL: No acute distress, Well appearing and non-toxic, Alert and oriented X 3   HEENT: Normocephalic, atraumatic.  Moist mucous membranes.  Pupils equal round reactive to light accommodation, extraocular motion is intact, sclerae white, conjunctiva is pink.  Oropharynx is unremarkable, no exudate.  No nystagmus.  TMs clear bilaterally  NECK: Supple, trachea midline, no lymphadenopathy.   LUNG: Lungs clear to auscultation bilaterally, no wheezing, no rales, no rhonchi.  CARDIOVASCULAR: Regular rate and rhythm.  Normal S1S2.  No S3S4 or murmur.  ABDOMEN: Bowel sounds are present. Soft, nontender,  nondistended, no pulsatile masses.    MUSCULOSKELETAL: No calf tenderness.  No clubbing, cyanosis, or edema.  Dorsalis pedis and posterior tibial pulses present  SKIN EXAMINATIoN: Warm and dry with normal appearance.  No rashes or lesions.  NEUROLOGICAL:  Awake,  Motor strength 5/5 all groups.  normal sensation.  Cranial nerves grossly intact II-XII.  Speech intact.  Normal finger to nose.  Normal heel to shin testing.   no truncal ataxia.     ED Course     Labs Reviewed   CBC WITH DIFFERENTIAL WITH PLATELET - Abnormal; Notable for the following components:       Result Value    MCV 78.2 (*)     MCH 25.8 (*)     All other components within normal limits   COMP METABOLIC PANEL (14) - Abnormal; Notable for the following components:    Glucose 111 (*)     AST 65 (*)      (*)     All other components within normal limits   TROPONIN I HIGH SENSITIVITY - Normal   RAINBOW DRAW BLUE   AST 65.    EKG    Rate, intervals and axes as noted on EKG Report.  Rate: 72  Rhythm: Sinus Rhythm  Reading: No acute changes                My brain/MRA head and neck read by vision rad radiology showing no evidence for acute brain abnormality.  Small cystic appearing lesion in the region of the right jugular foramen possibly small meningocele.  MRA head and neck shows no LVO, aneurysm or dissection       MDM      Patient is a 34-year-old female presents to ED for evaluation of palpitations and vertigo.    1.  Palpitations differential cardiac arrhythmia, electrolyte abnormality.  EKG normal.  Laboratory test obtained which were unremarkable except for mild elevation of her transaminases.  Recommend outpatient cardiac Holter monitor which she was written for.  Follow-up with cardiology after this    2.  Vertigo: Differential BPV, labyrinthitis, stroke.  Patient with continuous vertigo so decided to obtain MRI brain/MRA head and neck showing no evidence for acute abnormality.  Symptoms likely due to labyrinthitis.  Will send  patient home on Antivert and Valium.  Follow-up with ENT.        Medical Decision Making      Disposition and Plan     Clinical Impression:  1. Palpitations    2. Vertigo         Disposition:  There is no disposition on file for this visit.  There is no disposition time on file for this visit.    Follow-up:  Vikash Mishra MD  [address] [place] [id number]  [phone]    Follow up in 1 week(s)            Medications Prescribed:  Current Discharge Medication List        START taking these medications    Details   meclizine 25 MG Oral Tab Take 1 tablet (25 mg total) by mouth 3 (three) times daily as needed for Dizziness.  Qty: 20 tablet, Refills: 0                 Supplementary Documentation:

## 2025-01-24 NOTE — DISCHARGE INSTRUCTIONS
May take meclizine for vertigo    May take Valium for severe vertigo but do not drink or drive on this as it may make you drowsy.    Call [phone] to schedule Holter monitor    Follow-up with cardiology after Holter monitor to go over results

## 2025-01-25 NOTE — PROGRESS NOTES
Video Visit    This visit is conducted using Telemedicine with live, interactive video and audio.    Karyn Pickering  verbally consents to a Video visit.    Patient understands and accepts financial responsibility for any deductible, co-insurance and/or co-pays associated with this service.    Duration of the service: 16:33 minutes      Summary of topics discussed:     IC f-u: Pt was in the IC earlier today for having heart palpitations, feelign like her heart was skipping beats. Episodes were lasting about 5 minutes, with associated dizziness.   She had a negative workup- EKG normal, labs r/o ACS. She was dishcharged to divya with Meclizine and Diazepam and told to get a Holter monitor.  She has not picked up her new Rxs yet.     She notes her LFTs were elevated when in hospital.  She feels well- has no abdominal pain, does not have any abdominal distension, no RUQ pain, has no jaundice.    ROS: as stated per HPI  Physical Exam: Exam is limited due to no face to face visit today. Pt is awake and alert, does not appear to be in acute distress. Does not appear jaundiced- no scleral icterus.      Assessment/Plan:  1) Elevated LFTs  - AST 65 and , after review of previous LFTs since 6/2024 has been steadily trending upward  - check US Liver and rpt LFTs in about 3-4 wks  - caution with Tylenol    2) Palpitations  - normal workup at ED on 1/24/25- reviewed all notes and labs with pt at appt today  - agree with heart monitor  - await results     3) Dizziness  - neg workup at ED, as above  - cont Meclizine and Valium prn    Orders Placed This Encounter    Comp Metabolic Panel (14) [E]     Standing Status:   Future     Standing Expiration Date:   1/24/2026     Order Specific Question:   Release to patient     Answer:   Immediate    acetaminophen-codeine 300-30 MG Oral Tab     Sig: Take 1 tablet by mouth every 6 (six) hours as needed for Pain.    DISCONTD: cephALEXin 500 MG Oral Cap    Ergocalciferol (VITAMIN D CAPS  95178 IU OR)    ergocalciferol 1.25 MG (92932 UT) Oral Cap    Fluocinolone Acetonide Scalp 0.01 % External Oil     Sig: Apply at bedtime to aa on scalp    Tirzepatide-Weight Management (ZEPBOUND) 2.5 MG/0.5ML Subcutaneous Solution     Sig: Inject 2.5 mg into the skin once a week.    Tirzepatide (MOUNJARO) 2.5 MG/0.5ML Subcutaneous Solution Auto-injector     Sig: Inject 2.5 mg into the skin once a week.    acidophilus-pectin Oral Cap     Sig: Take 1 capsule by mouth daily.    hydrocortisone 2.5 % External Cream     Sig: Apply bid to aa on face      Orders Placed This Encounter   Procedures    Comp Metabolic Panel (14) [E]    US ABDOMEN COMPLETE (CPT=76700)          Return for follow-up depending on lab results.     The patient and provider have a longitudinal relationship to address/treat the serious or   complex condition(s) as stated in this encounter.     Total time spent for audio/video visit and note writin:33  min    Shauna Paulson MD

## 2025-02-24 ENCOUNTER — HOSPITAL ENCOUNTER (OUTPATIENT)
Dept: ULTRASOUND IMAGING | Age: 35
Discharge: HOME | End: 2025-02-24
Attending: FAMILY MEDICINE
Payer: COMMERCIAL

## 2025-02-24 ENCOUNTER — LAB ENCOUNTER (OUTPATIENT)
Dept: LAB | Age: 35
End: 2025-02-24
Attending: FAMILY MEDICINE
Payer: COMMERCIAL

## 2025-02-24 DIAGNOSIS — R79.89 ELEVATED LFTS: ICD-10-CM

## 2025-02-24 LAB
ALBUMIN SERPL-MCNC: 4.3 G/DL (ref 3.2–4.8)
ALBUMIN/GLOB SERPL: 1.4 {RATIO} (ref 1–2)
ALP LIVER SERPL-CCNC: 77 U/L
ALT SERPL-CCNC: 41 U/L
ANION GAP SERPL CALC-SCNC: 7 MMOL/L (ref 0–18)
AST SERPL-CCNC: 31 U/L (ref ?–34)
BILIRUB SERPL-MCNC: 0.3 MG/DL (ref 0.3–1.2)
BUN BLD-MCNC: 10 MG/DL (ref 9–23)
CALCIUM BLD-MCNC: 9.2 MG/DL (ref 8.7–10.6)
CHLORIDE SERPL-SCNC: 103 MMOL/L (ref 98–112)
CO2 SERPL-SCNC: 28 MMOL/L (ref 21–32)
CREAT BLD-MCNC: 0.86 MG/DL
EGFRCR SERPLBLD CKD-EPI 2021: 91 ML/MIN/1.73M2 (ref 60–?)
FASTING STATUS PATIENT QL REPORTED: YES
GLOBULIN PLAS-MCNC: 3 G/DL (ref 2–3.5)
GLUCOSE BLD-MCNC: 100 MG/DL (ref 70–99)
OSMOLALITY SERPL CALC.SUM OF ELEC: 285 MOSM/KG (ref 275–295)
POTASSIUM SERPL-SCNC: 3.9 MMOL/L (ref 3.5–5.1)
PROT SERPL-MCNC: 7.3 G/DL (ref 5.7–8.2)
SODIUM SERPL-SCNC: 138 MMOL/L (ref 136–145)

## 2025-02-24 PROCEDURE — 76700 US EXAM ABDOM COMPLETE: CPT | Performed by: FAMILY MEDICINE

## 2025-02-24 PROCEDURE — 80053 COMPREHEN METABOLIC PANEL: CPT | Performed by: FAMILY MEDICINE

## 2025-03-13 ENCOUNTER — MED REC SCAN ONLY (OUTPATIENT)
Dept: FAMILY MEDICINE CLINIC | Facility: CLINIC | Age: 35
End: 2025-03-13

## 2025-04-29 ENCOUNTER — NURSE TRIAGE (OUTPATIENT)
Dept: FAMILY MEDICINE CLINIC | Facility: CLINIC | Age: 35
End: 2025-04-29

## 2025-04-29 NOTE — TELEPHONE ENCOUNTER
Pt called office stating that she has been having high BP for the last week from 160/100 - 140/90 and she has back pain. Pt wants to know what to do

## 2025-04-29 NOTE — TELEPHONE ENCOUNTER
Pt. called stating that she has been having elevated BP readings since her pain started on Thursday.     Some of the BP readings provided by pt:   160/100  140/90     Pt. scheduled an appt. for tomorrow 04/30/25 with Dr. Paulson.    Pt. advised to go to the IC- and keep appt. for tomorrow if no improvement from IC. Pt. verbalized understanding.    Answer Assessment - Initial Assessment Questions  1. ONSET: \"When did the pain begin?\"       Pt. states she's been having pain since Thursday 04/24/25.    2. LOCATION: \"Where does it hurt?\" (upper, mid or lower back)    Pt. states that the pain is located at her lower back, right side, and \"goes down butt to leg\".    3. SEVERITY: \"How bad is the pain?\"  (e.g., Scale 1-10; mild, moderate, or severe)    - SEVERE (8-10): Excruciating pain, unable to do any normal activities.       Pt. states that the pain is a 9/10    4. PATTERN: \"Is the pain constant?\" (e.g., yes, no; constant, intermittent)      Pt. states that the pain is constant.     5. RADIATION: \"Does the pain shoot into your legs or somewhere else?\"      Pt. states that the lower right side of her back radiates to her butt and right leg.    6. CAUSE:  \"What do you think is causing the back pain?\"    Pt. unsure. Pt. states the pain came out of no where.     7. BACK OVERUSE:  \"Any recent lifting of heavy objects, strenuous work or exercise?\"  8. MEDICINES: \"What have you taken so far for the pain?\" (e.g., nothing, acetaminophen, NSAIDS)    Pt. states taking 800 mg of ibuprofen daily but pt. states she feels no relief.     9. NEUROLOGIC SYMPTOMS: \"Do you have any weakness, numbness, or problems with bowel/bladder control?\"     Pt. denies these symptoms.    10. OTHER SYMPTOMS: \"Do you have any other symptoms?\" (e.g., fever, abdomen pain, burning with urination, blood in urine)      Pt. states that her blood pressure has been elevated since last Thursday. Pt. has no history of hypertension, and no previous history  of elevated blood pressures.     11. PREGNANCY: \"Is there any chance you are pregnant?\" \"When was your last menstrual period?\"    Protocols used: Back Pain-A-[place]

## (undated) DEVICE — ELECTRODE ES L16.5CM BLDE MPLR OPN APPRCH EZ

## (undated) DEVICE — TUBE SUCT STD TRNSPAR RIG W/ BLB TIP RIB 5IN1

## (undated) DEVICE — SUTURE CHROMIC GUT SZ 2-0 L27IN ABSRB UD

## (undated) DEVICE — STERILE POLYISOPRENE POWDER-FREE SURGICAL GLOVES: Brand: PROTEXIS

## (undated) DEVICE — SUTURE VCRL SZ 0 L27IN ABSRB UD L36MM CT-1

## (undated) DEVICE — KIT INCIS MGMT 13CM PEEL AND PLC DISP PREVENA

## (undated) DEVICE — LAPAROTOMY SPONGE - RF AND X-RAY DETECTABLE PRE-WASHED: Brand: SITUATE

## (undated) DEVICE — SUTURE MCRYL SZ 2-0 L27IN ABSRB UD SH L26MM

## (undated) DEVICE — Device

## (undated) DEVICE — SOLUTION IRRIG 1000ML 0.9% NACL USP BTL

## (undated) DEVICE — LAPAROTOMY CDS: Brand: MEDLINE INDUSTRIES, INC.

## (undated) DEVICE — SUTURE PDS II SZ 0 L60IN ABSRB VLT L48MM CTX

## (undated) DEVICE — LARGE, DISPOSABLE ALEXIS O C-SECTION PROTECTOR - RETRACTOR: Brand: ALEXIS ® O C-SECTION PROTECTOR - RETRACTOR

## (undated) DEVICE — STAPLER SKIN 30 ABSRB STPL RAP INSORB

## (undated) DEVICE — SUTURE VCRL SZ 4-0 L27IN ABSRB UD L60MM KS

## (undated) DEVICE — DECANTER BAG 9": Brand: MEDLINE INDUSTRIES, INC.

## (undated) DEVICE — SLEEVE COMPR M KNEE LEN SGL USE KENDALL SCD

## (undated) DEVICE — SKN PREP SPNG STKS PVP PNT STR: Brand: MEDLINE INDUSTRIES, INC.

## (undated) DEVICE — LIGHT HANDLE

## (undated) DEVICE — UNDERPANTS INCONT 2XL KNIT MAT

## (undated) DEVICE — ABSORBABLE HEMOSTAT (OXIDIZED REGENERATED CELLULOSE, U.S.P.): Brand: SURGICEL

## (undated) NOTE — Clinical Note
Hello! I had the pleasure of seeing your patient, Lorraine Marks, today in the office and have routed the progress note to you for your review. Thank you for the referral and please contact me if you have any questions.   Sincerely,  Jc Redd MD [address] [facility]  Office: [phone]

## (undated) NOTE — LETTER
OUTSIDE TESTING RESULT REQUEST     IMPORTANT: FOR YOUR IMMEDIATE ATTENTION  Please FAX all test results listed below to: [phone]     Testing already done on or about: 2023     * * * * If testing is NOT complete, arrange with patient A.S.A.P. * * * *      Patient Name: Myron Hernandez  Surgery Date: 2023  Medical Record: [id number]  CSN: [id number]  : 3/18/1990 - A: 29 y     Sex: female  Surgeon(s):  MD Ben Clark MD  Procedure: Abdominal myomectomy  Anesthesia Type: General     Surgeon: Neil Jj MD     The following Testing and Time Line are REQUIRED PER ANESTHESIA     BMP (requires 4 hour fast) within  90 days  Electrolytes with 21 days      Thank You,   Sent by: Lynne Sanchez RN

## (undated) NOTE — LETTER
24    Re: Karyn Pickering  : 3/18/1990      To Whom It May Concern:  Karyn Pickering is under my care. She may return to work with reduced hours of 4-5 hours per day for two weeks, starting 23, then may resume work without restrictions.    If you have any questions concerning this letter, please feel free to contact my office.      Sincerely yours,    Lindsey Farrell MD

## (undated) NOTE — LETTER
Date: 8/8/2022    Patient Name: Giovanna Beltran          To Whom it may concern: This letter has been written at the patient's request. The above patient was seen at the [facility] for treatment of a medical condition. This patient should be excused from attending work from 08/09/22 through 08/21/22. The patient may return to work on 08/22/22 without restrictions.         Sincerely,      Brittney Xie MD

## (undated) NOTE — LETTER
Date: 8/8/2022    Patient Name: Alex Orozco          To Whom it may concern: This letter has been written at the patient's request. The above patient was seen at the [facility] for treatment of a medical condition. The patient may return to work on 08/09/22 with the following limitations:  - no heavy lifting/pushing/pulling (nothing greater than 15 lbs)    These restrictions are effective starting 08/09/22 through 08/21/22.         Sincerely,      Sophie Andersen MD

## (undated) NOTE — LETTER
24    Re: Karyn Pickering  : 3/18/1990    To Whom It May Concern:  Karyn Pickering is under my care and was provided treatment on 2024.  Please excuse her from work for the duration of her appointment. She is cleared to return to all activities, including work, as of 2024.   If you have any questions concerning this letter, please feel free to contact my office.    Sincerely yours,  Lindsey Farrell MD

## (undated) NOTE — LETTER
Date: 8/22/2022    Patient Name: Giovanna Beltran          To Whom it may concern: This letter has been written at the patient's request. The above patient was seen at the [facility] for treatment of a medical condition. The patient may return to work on 08/23/22 with the following limitations: none.         Sincerely,      Brittney Xie MD

## (undated) NOTE — LETTER
Re:  Evelyn Bah, :  3/18/1990      Dear Dr. Jose Rafael Rodriguez,    I am writing in regard to Evelyn Bah who is scheduled for abdominal myomectomy on 2023. I would like her to have a pre-op appointment with you prior to her surgery. You should be receiving the anesthesiologist recommended pre-op testing order from the pre-admission department, with a list of tests which she will require prior to surgery. If you do not receive the order, please contact the pre-admissions department. If you have any other questions or concerns, please feel free to contact me.     Thank you,      Elma Herrmann MD

## (undated) NOTE — LETTER
Karyn Pickering, :3/18/1990    CONSENT FOR PROCEDURE/SEDATION    1. I authorize the performance upon Karyn Pickering  the following: Insertion Intrauterine Device Mirena     2. I authorize Dr. Lindsey Farrell MD (and whomever is designated as the doctor’s assistant), to perform the above-mentioned procedures.    3. If any unforeseen conditions arise during this procedure calling for additional  procedures, operations, or medications (including anesthesia and blood transfusion), I further request and authorize the doctor to do whatever he/she deems advisable in my interest.    4. I consent to the taking and reproduction of any photographs in the course of this procedure for professional purposes.    5. I consent to the administration of such sedation as may be considered necessary or advisable by the physician responsible for this service, with the exception of ______________________________________________________    6. I have been informed by my doctor of the nature and purpose of this procedure sedation, possible alternative methods of treatment, risk involved and possible complications.    7. If I have a Do Not Resuscitate (DNR) order in place, the physician and I (or the individual authorized to consent on my behalf) will discuss and agree as to whether the Do Not Resuscitate (DNR) order will remain in effect or will be discontinued during the performance of the procedure and the applicable recovery period. If the Do Not Resuscitate (DNR) order is discontinued and is to be reinstated following the procedure/recovery period, the physician will determine when the applicable recovery period ends for purposes of reinstating the Do Not Resuscitate (DNR) order.    Signature of Patient:_______________________________________________    Signature of person authorized to consent for patient:  _______________________________________________________________    Relationship to patient:  ____________________________________________    Witness: _________________________________________ Date:___________     Physician Signature: _______________________________ Date:___________